# Patient Record
Sex: MALE | Race: OTHER | HISPANIC OR LATINO | Employment: FULL TIME | URBAN - METROPOLITAN AREA
[De-identification: names, ages, dates, MRNs, and addresses within clinical notes are randomized per-mention and may not be internally consistent; named-entity substitution may affect disease eponyms.]

---

## 2017-04-07 ENCOUNTER — ALLSCRIPTS OFFICE VISIT (OUTPATIENT)
Dept: OTHER | Facility: OTHER | Age: 47
End: 2017-04-07

## 2017-04-07 DIAGNOSIS — I10 ESSENTIAL (PRIMARY) HYPERTENSION: ICD-10-CM

## 2017-04-07 DIAGNOSIS — E78.5 HYPERLIPIDEMIA: ICD-10-CM

## 2017-04-07 DIAGNOSIS — S39.012A STRAIN OF MUSCLE, FASCIA AND TENDON OF LOWER BACK, INITIAL ENCOUNTER: ICD-10-CM

## 2017-04-07 DIAGNOSIS — E11.22 TYPE 2 DIABETES MELLITUS WITH DIABETIC CHRONIC KIDNEY DISEASE (HCC): ICD-10-CM

## 2017-04-09 ENCOUNTER — TRANSCRIBE ORDERS (OUTPATIENT)
Dept: LAB | Facility: HOSPITAL | Age: 47
End: 2017-04-09

## 2017-04-09 ENCOUNTER — APPOINTMENT (OUTPATIENT)
Dept: LAB | Facility: HOSPITAL | Age: 47
End: 2017-04-09
Payer: COMMERCIAL

## 2017-04-09 DIAGNOSIS — E11.22 TYPE 2 DIABETES MELLITUS WITH DIABETIC CHRONIC KIDNEY DISEASE (HCC): ICD-10-CM

## 2017-04-09 DIAGNOSIS — E78.5 HYPERLIPIDEMIA: ICD-10-CM

## 2017-04-09 DIAGNOSIS — I10 ESSENTIAL (PRIMARY) HYPERTENSION: ICD-10-CM

## 2017-04-09 LAB
ALBUMIN SERPL BCP-MCNC: 4 G/DL (ref 3.5–5)
ALP SERPL-CCNC: 80 U/L (ref 46–116)
ALT SERPL W P-5'-P-CCNC: 36 U/L (ref 12–78)
ANION GAP SERPL CALCULATED.3IONS-SCNC: 5 MMOL/L (ref 4–13)
AST SERPL W P-5'-P-CCNC: 10 U/L (ref 5–45)
BILIRUB SERPL-MCNC: 0.68 MG/DL (ref 0.2–1)
BUN SERPL-MCNC: 11 MG/DL (ref 5–25)
CALCIUM SERPL-MCNC: 8.8 MG/DL (ref 8.3–10.1)
CHLORIDE SERPL-SCNC: 103 MMOL/L (ref 100–108)
CHOLEST SERPL-MCNC: 92 MG/DL (ref 50–200)
CO2 SERPL-SCNC: 31 MMOL/L (ref 21–32)
CREAT SERPL-MCNC: 0.82 MG/DL (ref 0.6–1.3)
CREAT UR-MCNC: 77.3 MG/DL
EST. AVERAGE GLUCOSE BLD GHB EST-MCNC: 214 MG/DL
GFR SERPL CREATININE-BSD FRML MDRD: >60 ML/MIN/1.73SQ M
GLUCOSE P FAST SERPL-MCNC: 180 MG/DL (ref 65–99)
HBA1C MFR BLD: 9.1 % (ref 4.2–6.3)
HDLC SERPL-MCNC: 30 MG/DL (ref 40–60)
LDLC SERPL CALC-MCNC: 39 MG/DL (ref 0–100)
MICROALBUMIN UR-MCNC: 183 MG/L (ref 0–20)
MICROALBUMIN/CREAT 24H UR: 237 MG/G CREATININE (ref 0–30)
POTASSIUM SERPL-SCNC: 4.3 MMOL/L (ref 3.5–5.3)
PROT SERPL-MCNC: 7.4 G/DL (ref 6.4–8.2)
SODIUM SERPL-SCNC: 139 MMOL/L (ref 136–145)
TRIGL SERPL-MCNC: 114 MG/DL

## 2017-04-09 PROCEDURE — 36415 COLL VENOUS BLD VENIPUNCTURE: CPT

## 2017-04-09 PROCEDURE — 83036 HEMOGLOBIN GLYCOSYLATED A1C: CPT

## 2017-04-09 PROCEDURE — 82570 ASSAY OF URINE CREATININE: CPT

## 2017-04-09 PROCEDURE — 82043 UR ALBUMIN QUANTITATIVE: CPT

## 2017-04-09 PROCEDURE — 80061 LIPID PANEL: CPT

## 2017-04-09 PROCEDURE — 80053 COMPREHEN METABOLIC PANEL: CPT

## 2017-04-14 ENCOUNTER — ALLSCRIPTS OFFICE VISIT (OUTPATIENT)
Dept: OTHER | Facility: OTHER | Age: 47
End: 2017-04-14

## 2017-04-25 ENCOUNTER — OFFICE VISIT (OUTPATIENT)
Dept: URGENT CARE | Age: 47
End: 2017-04-25
Payer: COMMERCIAL

## 2017-04-25 PROCEDURE — S9083 URGENT CARE CENTER GLOBAL: HCPCS | Performed by: FAMILY MEDICINE

## 2017-04-25 PROCEDURE — G0382 LEV 3 HOSP TYPE B ED VISIT: HCPCS | Performed by: FAMILY MEDICINE

## 2017-05-02 ENCOUNTER — GENERIC CONVERSION - ENCOUNTER (OUTPATIENT)
Dept: OTHER | Facility: OTHER | Age: 47
End: 2017-05-02

## 2017-05-06 ENCOUNTER — OFFICE VISIT (OUTPATIENT)
Dept: URGENT CARE | Age: 47
End: 2017-05-06
Payer: COMMERCIAL

## 2017-05-06 PROCEDURE — S9083 URGENT CARE CENTER GLOBAL: HCPCS | Performed by: FAMILY MEDICINE

## 2017-05-06 PROCEDURE — G0382 LEV 3 HOSP TYPE B ED VISIT: HCPCS | Performed by: FAMILY MEDICINE

## 2017-05-16 ENCOUNTER — ALLSCRIPTS OFFICE VISIT (OUTPATIENT)
Dept: OTHER | Facility: OTHER | Age: 47
End: 2017-05-16

## 2017-10-23 ENCOUNTER — APPOINTMENT (OUTPATIENT)
Dept: RADIOLOGY | Age: 47
End: 2017-10-23
Attending: FAMILY MEDICINE
Payer: COMMERCIAL

## 2017-10-23 ENCOUNTER — TRANSCRIBE ORDERS (OUTPATIENT)
Dept: URGENT CARE | Age: 47
End: 2017-10-23

## 2017-10-23 ENCOUNTER — OFFICE VISIT (OUTPATIENT)
Dept: URGENT CARE | Age: 47
End: 2017-10-23
Payer: COMMERCIAL

## 2017-10-23 DIAGNOSIS — S99.929A INJURY OF FOOT: ICD-10-CM

## 2017-10-23 PROCEDURE — G0383 LEV 4 HOSP TYPE B ED VISIT: HCPCS | Performed by: FAMILY MEDICINE

## 2017-10-23 PROCEDURE — S9083 URGENT CARE CENTER GLOBAL: HCPCS | Performed by: FAMILY MEDICINE

## 2017-10-23 PROCEDURE — 73630 X-RAY EXAM OF FOOT: CPT

## 2017-10-23 PROCEDURE — 73610 X-RAY EXAM OF ANKLE: CPT
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
## 2018-01-12 VITALS
HEIGHT: 68 IN | HEART RATE: 76 BPM | OXYGEN SATURATION: 97 % | SYSTOLIC BLOOD PRESSURE: 160 MMHG | RESPIRATION RATE: 16 BRPM | DIASTOLIC BLOOD PRESSURE: 110 MMHG | WEIGHT: 241.13 LBS | BODY MASS INDEX: 36.54 KG/M2

## 2018-01-15 VITALS
HEIGHT: 68 IN | BODY MASS INDEX: 36.54 KG/M2 | WEIGHT: 241.13 LBS | TEMPERATURE: 99.5 F | HEART RATE: 78 BPM | DIASTOLIC BLOOD PRESSURE: 100 MMHG | RESPIRATION RATE: 16 BRPM | SYSTOLIC BLOOD PRESSURE: 160 MMHG

## 2018-01-15 NOTE — MISCELLANEOUS
Provider Comments  Provider Comments:   PT WAS A NO SHOW TODAY      Signatures   Electronically signed by : Zander Norwood, ; May 16 2017  5:57PM EST                       (Author)

## 2018-01-18 NOTE — MISCELLANEOUS
Message  Return to work or school:   Chandan South is under my professional care  He was seen in my office on 4/25/17        Mr Brandi David is not contagious        Future Appointments    Signatures   Electronically signed by : LUIS Askew ; Apr 25 2017 10:20AM EST                       (Author)

## 2018-01-18 NOTE — MISCELLANEOUS
Message  Return to work or school:   Chandan South is under my professional care  He was seen in my office on 10/23/2017   He is able to return to work on  10/24/17       Leatha Jerome PA-C        Signatures   Electronically signed by : Leatha Jerome, [facility]; Oct 23 2017  3:00PM EST                       (Author)    Electronically signed by : Leatha Jerome, [facility]; Oct 23 2017  3:04PM EST
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
## 2018-01-18 NOTE — MISCELLANEOUS
Chief Complaint  Chief Complaint: Chief Complaint:   The patient presents to the office today with ER 2nd to high BP & glucose readings at home  Message  Message Free Text Note Form: ER:   - HTN- 206 came down to 717 systolic without medication  Pt said home range is 130-190 on metoprolol bid & lisinopril 5   - DM2- checks sugar after eating and it is high  300s after breakfast  Complains of daily blurry vision  I personally saw the patient in the ER before discharge to go over his plan of care as described below  Discussion/Summary  Discussion Summary:   1) HTN  --> increased lisinopril 5 to 10 qd     2) DM2  --> Increased metformin 500 bid to 1000mg bid (pt denied GI issues on bid x3 months)  --> Educated to check sugar ac and hs  3) Blurry vision  --> Ophthal referral and info given personally to patient    4) candida balanitis  --> clotrimazole sent to pharmacy x3 weeks bid, will reassess thursday in office  Counseled on hygiene and avoiding soap and petroleum or irritants to the area  Last A1C 8, +microalbuminuria (10/2016)    Refilled all meds at [facility]  He's on lipitor  Will recheck BP on thursday in office and repeat A1C        Signatures   Electronically signed by : Linden Julian DO; Dec 27 2016 10:20AM EST                       (Author)

## 2018-01-29 ENCOUNTER — OFFICE VISIT (OUTPATIENT)
Dept: FAMILY MEDICINE CLINIC | Facility: CLINIC | Age: 48
End: 2018-01-29
Payer: COMMERCIAL

## 2018-01-29 VITALS
HEART RATE: 72 BPM | DIASTOLIC BLOOD PRESSURE: 96 MMHG | RESPIRATION RATE: 16 BRPM | SYSTOLIC BLOOD PRESSURE: 160 MMHG | WEIGHT: 257 LBS | TEMPERATURE: 97.1 F | HEIGHT: 69 IN | BODY MASS INDEX: 38.06 KG/M2

## 2018-01-29 DIAGNOSIS — E11.9 TYPE 2 DIABETES MELLITUS WITHOUT COMPLICATION, WITHOUT LONG-TERM CURRENT USE OF INSULIN (HCC): ICD-10-CM

## 2018-01-29 DIAGNOSIS — I10 HYPERTENSION, UNSPECIFIED TYPE: Primary | ICD-10-CM

## 2018-01-29 PROCEDURE — 99213 OFFICE O/P EST LOW 20 MIN: CPT | Performed by: FAMILY MEDICINE

## 2018-01-29 RX ORDER — GLIMEPIRIDE 2 MG/1
TABLET ORAL
COMMUNITY
Start: 2017-04-13 | End: 2018-03-20 | Stop reason: SDDI

## 2018-01-29 RX ORDER — LANCETS
EACH MISCELLANEOUS 2 TIMES DAILY
Qty: 60 EACH | Refills: 5 | Status: SHIPPED | OUTPATIENT
Start: 2018-01-29 | End: 2020-07-07 | Stop reason: SDUPTHER

## 2018-01-29 RX ORDER — NAPROXEN 500 MG/1
500 TABLET ORAL EVERY 12 HOURS
Refills: 0 | COMMUNITY
Start: 2017-10-26 | End: 2019-07-11 | Stop reason: ALTCHOICE

## 2018-01-29 RX ORDER — LANCETS
EACH MISCELLANEOUS 2 TIMES DAILY
COMMUNITY
Start: 2016-10-06 | End: 2018-01-29 | Stop reason: SDUPTHER

## 2018-01-29 RX ORDER — METOPROLOL TARTRATE 100 MG/1
100 TABLET ORAL 2 TIMES DAILY
Qty: 60 TABLET | Refills: 5 | Status: SHIPPED | OUTPATIENT
Start: 2018-01-29 | End: 2018-07-24 | Stop reason: SDUPTHER

## 2018-01-29 RX ORDER — LISINOPRIL 40 MG/1
40 TABLET ORAL DAILY
Qty: 30 TABLET | Refills: 0 | Status: SHIPPED | OUTPATIENT
Start: 2018-01-29 | End: 2018-03-16 | Stop reason: SDUPTHER

## 2018-01-29 RX ORDER — METOPROLOL TARTRATE 100 MG/1
1 TABLET ORAL 2 TIMES DAILY
COMMUNITY
Start: 2014-10-08 | End: 2018-01-29 | Stop reason: SDUPTHER

## 2018-01-29 RX ORDER — LISINOPRIL 20 MG/1
1 TABLET ORAL DAILY
COMMUNITY
Start: 2016-12-27 | End: 2018-01-29 | Stop reason: DRUGHIGH

## 2018-01-29 NOTE — PROGRESS NOTES
Debbi Peabody 1970 male MRN: [id number]    Family Medicine Acute Visit    ASSESSMENT/PLAN  Problem List Items Addressed This Visit     Hypertension - Primary     Blood Pressure: 160/96   - Given elevation >200/100 while admitted to hospital, and continued elevation above goal, will increase Lisinopril from 20 mg to 40 mg daily and continue Metoprolol 100 mg BID   - Discussed precautions including headache, vision changes, dizziness, chest pain, palpitations -- encouraged pt to return to ED if develops any of the above          Relevant Medications    metoprolol tartrate (LOPRESSOR) 100 mg tablet    lisinopril (ZESTRIL) 40 mg tablet    Type 2 diabetes mellitus without complication, without long-term current use of insulin ([facility])     A1c 7 1% at [facility] during admission (previously 9 1% in 4/2017)   - Continue current regimen of Metformin 500 mg TID w/ meals and Glimepiride 2 mg daily   - Pt states that his blood sugar was elevated (>300) during hospital admission, therefore encouraged him to check AM sugars prior to breakfast x1 week to monitor -- if within good range, pt will not need to check blood sugars regularly         Relevant Medications    glimepiride (AMARYL) 2 mg tablet    metFORMIN (GLUCOPHAGE) 500 mg tablet    ACCU-CHEK FASTCLIX LANCETS MISC    glucose blood (ACCU-CHEK SMARTVIEW) test strip              Future Appointments  Date Time Provider Beena Delgado   2/5/2018 2:20 PM DO TR Zheng [facility]          SUBJECTIVE  CC: Hypertension and Follow-up      HPI:  Debbi Peabody is a 41 y o  male who presents for hospital follow up  Pt was admitted to [facility] from 1/25-1/26 due to elevated BP  Records reviewed: pt had negative CT Head as well as an ECHO which demonstrated mild concentric LVH, trace MR, mild TR, mild pulmonary HTN and EF 65%       HTN   - Has been taking Lopressor, Metoprolol   - Pt notes increased stress lately  - Pt's wife states he eats a great deal of salt   - Pt states that he had headache, dizziness, and vision changes, which is what prompted him to go to the ED     DM2  - Has been taking Metformin, Glimepiride  - A1c during admission 7 1%   - Pt requesting refill of medications, test strips, and lancets        Review of Systems   Eyes: Positive for visual disturbance  Cardiovascular: Negative for chest pain, palpitations and leg swelling  Neurological: Positive for dizziness and headaches  Historical Information   The patient history was reviewed as follows:  Past Medical History:   Diagnosis Date    Diabetes mellitus ([facility] [address] )     Hypertension          History reviewed  No pertinent surgical history    Family History   Problem Relation Age of Onset    Hypertension Mother     Heart disease Mother     Sleep apnea Mother     Diabetes Father     Heart disease Father     Hypertension Father       Social History   History   Alcohol Use No     History   Drug Use No     History   Smoking Status    Never Smoker   Smokeless Tobacco    Current User       Medications:     Current Outpatient Prescriptions:     ACCU-CHEK FASTCLIX LANCETS MISC, by Does not apply route 2 (two) times a day, Disp: 60 each, Rfl: 5    cyanocobalamin ([employer]S VITAMIN B-12) 1000 MCG tablet, Take 1,000 mcg by mouth, Disp: , Rfl:     glimepiride (AMARYL) 2 mg tablet, Take by mouth, Disp: , Rfl:     glucose blood (ACCU-CHEK SMARTVIEW) test strip, 1 each by Other route 2 (two) times a day, Disp: 100 each, Rfl: 5    metFORMIN (GLUCOPHAGE) 500 mg tablet, Take 1 tablet (500 mg total) by mouth 3 (three) times a day with meals, Disp: 90 tablet, Rfl: 0    metoprolol tartrate (LOPRESSOR) 100 mg tablet, Take 1 tablet (100 mg total) by mouth 2 (two) times a day, Disp: 60 tablet, Rfl: 5    naproxen (NAPROSYN) 500 mg tablet, Take 500 mg by mouth every 12 (twelve) hours, Disp: , Rfl: 0    lisinopril (ZESTRIL) 40 mg tablet, Take 1 tablet (40 mg total) by mouth daily, Disp: 30 tablet, Rfl: 0    No Known Allergies    OBJECTIVE  Vitals:   Vitals:    01/29/18 1026   BP: 160/96   BP Location: Right arm   Patient Position: Sitting   Cuff Size: Large   Pulse: 72   Resp: 16   Temp: (!) 97 1 °F (36 2 °C)   TempSrc: Tympanic   Weight: 117 kg (257 lb)   Height: 5' 9 2" (1 758 m)         Physical Exam   Constitutional: He is oriented to person, place, and time  He appears well-developed and well-nourished  Cardiovascular: Normal rate and regular rhythm  Pulmonary/Chest: Effort normal and breath sounds normal    Abdominal: Soft  Bowel sounds are normal  He exhibits no distension  There is no tenderness  Neurological: He is alert and oriented to person, place, and time  He has normal strength  No cranial nerve deficit  Coordination normal    Skin: Skin is warm and dry  Psychiatric: He has a normal mood and affect                    Chandrakant Hoyt DO, PGY-2  [facility]   1/29/2018

## 2018-01-29 NOTE — ASSESSMENT & PLAN NOTE
A1c 7 1% at [facility] during admission (previously 9 1% in 4/2017)   - Continue current regimen of Metformin 500 mg TID w/ meals and Glimepiride 2 mg daily   - Pt states that his blood sugar was elevated (>300) during hospital admission, therefore encouraged him to check AM sugars prior to breakfast x1 week to monitor -- if within good range, pt will not need to check blood sugars regularly

## 2018-01-29 NOTE — PATIENT INSTRUCTIONS
10% - bad control"> 10% - bad control,Hemoglobin A1c (HbA1c) greater than 10% indicating poor diabetic control,Haemoglobin A1c greater than 10% indicating poor diabetic control">   Diabetes mellitus tipo 2 en adultos, cuidados ambulatorios   INFORMACIÓN GENERAL:   La diabetes mellitus tipo 2 en adultos  es homa enfermedad que afecta la forma en que el cuerpo utiliza la glucosa (azúcar)  La insulina ayuda a extraer el azúcar de la hay para que pueda usarse en la producción de energía  Generalmente, cuando el nivel de azúcar Jonah, el páncreas produce más insulina  La diabetes tipo 2 se desarrolla ya sea porque el cuerpo no puede producir suficiente insulina, o no la puede usar correctamente  Después de [employer], wolfe páncreas podría dejar de producir insulina  Síntomas comunes incluyen los siguientes:   · Más hambre o sed de la usual    · Necesidad frecuente de orinar     · Pérdida de peso sin tratar     · Visión borrosa  Busque cuidados inmediatos para los siguientes síntomas:   · Dolor abdominal severo o dolor que se propaga hacia wolfe espalda  Es probable que usted también vomite  · Dificultad para permanecer despierto o concentrado    · Temblores o sudoración    · Visión borrosa o doble    · Aliento con olor a frutas o dayron    · Respiración profunda y dificultosa o rápida y superficial    · Ritmo cardíaco rápido y débil  El tratamiento para la diabetes mellitus tipo 2  incluye mantener wolfe nivel de azúcar en el hay a un rango normal  Usted debe comer los alimentos correctos y ejercitarse con regularidad  Además es probable que necesite medicamentos si no puede controlar wolfe nivel de azúcar en la hay con nutrición y ejercicio  Maneje la diabetes mellitus tipo 2:   · Revise wolfe nivel de azúcar en la hay  A usted le enseñarán cómo revisar homa pequeña gota de [place]uma ness en un medidor de glucosa  Pregúntele a wolfe proveedor de amado cuándo y con cuánta frecuencia es necesario revisar lali el día   Marysol Moan pregúntele a wolfe proveedor de amado cuáles deberían ser denilson niveles de azúcar en la hay cuando usted se los revisa  · Mantenga un registro de los carbohidratos (azúcares y almidones)  Wolfe nivel de azúcar en la hay puede elevarse demasiado si usted come demasiados carbohidratos  Wolfe dietista le ayudará a planear comidas y meriendas que tengan la cantidad correcta de carbohidratos  · Consuma alimentos bajos en grasas  Danton Gale son el kenneth sin piel y la leche descremada  · Consuma menos sodio (sal)  Algunos ejemplos de alimentos altos en sodio que hay que limitar son la salsa de soya, las law tostadas y la sopa  No le agregue sal a la comida que usted cocina  Limite wolfe uso de sal de martinez  · Coma alimentos altos en fibra  Alimentos que son buena aviva de fibra incluyen los vegetales, el pan integral y los frijoles  · Limite el alcohol  El alcohol afecta wolfe nivel de azúcar en la hay y puede dificultar el [place] de wolfe diabetes  Las mujeres deben limitar el consumo de alcohol a 1 bebida al día  Los hombres deben limitarlo a 2 debidas al día  Homa bebida de alcohol equivale a 12 onzas de cerveza, 5 onzas de vino o 1½ onzas de licor  · Ejercítese regularmente  El ejercicio puede ayudar a mantener estable wolfe nivel de azúcar en la hay, al mismo tiempo que disminuye wolfe riesgo de enfermedad cardíaca y le ayuda a perder peso  Ejercítese por al menos 30 minutos, 5 días a la semana  Adron Cambric de fortalecimiento muscular 2 días a la semana  Colabore con wolfe proveedor de amado para crear un plan de ejercicios  · Revise denilson pies a diario  para hortensia si tienen heridas o llagas abiertas  Pregúntele a wolfe proveedor de [employer] que usted puede hacer si tiene homa llaga abierta  · Deje de fumar  Si usted fuma, nunca es tarde para dejar de hacerlo  El fumar puede [employer] problemas que puedan ocurrir con la diabetes   Pregúntele a wolfe proveedor de [employer] información para aprender a dejar de fumar si usted tiene dificultad para hacerlo  · Pregunte sobre cain peso:  Pregúntele a denilson proveedores de amado si usted necesita perder peso y cuánto debe perder  Pídales que le ayuden con un programa de perdida de [place]  Incluso perder unas 10 a 15 libras puede ayudarle a manejar cain nivel de azúcar en la hay  · Tenga a mano cain identificación de [employer]  Use un brazalete de alerta médica o lleve consigo homa tarjeta que diga que usted tiene diabetes  Pregúntele a cain proveedor de amado dónde conseguir estos artículos  · Pregunte sobre vacunas  La diabetes lo pone a usted en riesgo de enfermedad seria si usted se resfría, tiene neumonía o hepatitis  Pregúntele a cain proveedor de amado si usted debe ponerse las vacunas contra la gripe, neumonía o hepatitis B, y cuándo ponérselas  Programe homa pj con cain proveedor de [employer] se le haya indicado: Anote denilson preguntas para que se acuerde de hacerlas lali denilson visitas  ACUERDOS SOBRE CAIN CUIDADO:   Usted tiene el derecho de participar en la planificación de cain cuidado  Aprenda todo lo que pueda sobre cain condición y diane darle tratamiento  Discuta con denilson médicos denilson opciones de tratamiento para juntos decidir el cuidado que usted quiere recibir  Usted siempre tiene el derecho a rechazar cain tratamiento  Esta información es sólo para uso en educación  Cain intención no es darle un consejo médico sobre enfermedades o tratamientos  Colsulte con cain Hilario Jewels farmacéutico antes de seguir cualquier régimen médico para saber si es seguro y efectivo para usted  © 2014 [address] is for End User's use only and may not be sold, redistributed or otherwise used for commercial purposes  All illustrations and images included in CareNotes® are the copyrighted property of A D A M , Inc  or Davon Sidhu    Hipertensión crónica   LO QUE NECESITA SABHARRISON:   La hipertensión es la presión arterial terrence  La presión arterial es la fuerza que ejerce la hay contra las delacruz de las arterias  La presión arterial normal debería estar a menos de 120/80  La pre-hipertensión estaría entre 120/80 y 139/ 80  La presión arterial terrence estaría a 140/90 o más terrence  La hipertensión causa que wolfe presión arterial se eleve tanto que wolfe corazón se ve forzado a trabajar [employer] de lo normal  [place] puede dañar wolfe corazón  La hipertensión crónica es homa condición de jordon plazo que usted puede controlar con un estilo de moe j carlos o con medicamentos  La presión [place] a proteger denilson órganos diane wolfe corazón, pulmones, cerebro, y riñones  INSTRUCCIONES SOBRE EL [facility]ITALARIA:   Llame al 911 en modesta de presentar lo siguiente:   · Usted tiene malestar en el pecho que se siente diane estrujamiento, presión, Maldonado Fees o dolor  · Usted se siente confundido o tiene dificultad para hablar  · Repentinamente se siente aturdido o con dificultad para respirar  · Usted tiene dolor o [employer] espalda, [place], [place], abdomen o [place]  [place] a la veena de emergencias si:   · Usted tiene un nic dolor de belinda o pérdida de la visión  · Usted tiene debilidad en un brazo o en homa pierna  Pregúntele a wolfe Mary Broom vitaminas y minerales son adecuados para usted  · Usted se siente mareado, confundido, somnoliento o diane si se fuera a desmayar  · Usted se ha tomado wolfe medicamento para la presión arterial giovanni wolfe presión arterial todavía está más terrence de lo que le indicó wolfe médico     · Usted tiene preguntas o inquietudes acerca de wolfe condición o cuidado  Medicamentos:  Es posible que usted necesite alguno de los siguientes:  · Medicamento  podría usarse para ayudar a disminuir la presión arterial  Es posible que necesite más de un tipo de [place]  [place] el medicamento exactamente diane indicado       · Diuréticos  ayudan a eliminar el exceso de líquido que se acumula en el organismo  [place] contribuirá a bajar wolfe presión arterial  Es posible que orine más seguido mientras lauren salvatore medicamento  · Los medicamentos para el colesterol  ayudan a bajar los niveles de [place]  Un nivel bajo de colesterol ayuda a prevenir enfermedades cardíacas y facilita el control de la presión arterial      · Cubero denilson medicamentos diane se le haya indicado  Consulte con wolfe médico si usted pillo que wolfe medicamento no le está ayudando o si presenta efectos secundarios  Infórmele si es alérgico a cualquier medicamento  Mantenga homa lista actualizada de los [employer], las vitaminas y los productos herbales que lauren  Incluya los siguientes datos de los medicamentos: cantidad, frecuencia y motivo de administración  Traiga con usted la lista o los envases de la píldoras a denilson citas de seguimiento  Lleve la lista de los medicamentos con usted en modesta de homa emergencia  Acuda a denilson consultas de control con wolfe médico según le indicaron  Usted necesitará regresar para medir wolfe presión arterial y realizar otros exámenes de laboratorio  Anote denilson preguntas para que se acuerde de hacerlas lali denilson visitas  Controle la hipertensión crónica:  Hable con wolfe médico sobre las siguientes recomendaciones y otras formas de controlar la hipertensión:  · Tómese la presión arterial en wolfe casa  Siéntese y descanse por 5 minutos antes de tomarse la presión arterial  Extienda wolfe brazo y apóyelo en homa superficie plana  Wolfe brazo debe estar a la misma altura que wolfe corazón  Siga las instrucciones que vienen con el monitor para la presión arterial o tensiómetro  Si es posible tome por lo menos 2 lecturas de la presión cada vez  Tómese la presión arterial por lo [employer]previously [employer]) al día a la misma hora todos los días, homa en la mañana y la otra en la noche  Mantenga un registro de las lecturas de wolfe presión arterial y llévelo consigo a denilson consultas   Pregúntele a wolfe médico cuál debería ser wolfe presión arterial  · Limite el sodio (la sal) diane se le haya indicado  Demasiado sodio puede afectar el equilibrio de líquidos  Revise las etiquetas para buscar alimentos bajos en sodio o sin sal agregada  Algunos alimentos bajos en sodio utilizan sales de potasio para añadir sabor  Demasiado potasio también puede causar problemas de [place]  Wolfe médico le dirá qué cantidad de sodio y potasio es skel[place] para el consumo en un día  Él puede recomendarle que limite el sodio a 2,300 mg al día  · Siga el plan de comidas recomendado por wolfe médico   Un dietista o médico puede darle más información sobre planes de bajo contenido de sodio o el plan de alimentación DASH (enfoques dietéticos para detener la hipertensión)  El plan DASH es bajo en sodio, grasas saturadas y grasa total  Es alto en potasio, calcio y [place]  · Ejercítese para mantener un peso saludable  Realice actividad física por lo menos 30 minutos al día, la mayoría de los días de la [place]  Maybee ayudará a bajar wolfe presión arterial  Pida más información acerca de un plan de ejercicio adecuado para usted  · [address] [place] [address]  Maybee podría ayudarlo a bajar wolfe presión arterial  Aprenda sobre formas de relajarse, diane respiración profunda o escuchar música  · Limite el consumo de alcohol  Las mujeres deberían limitar el consumo de alcohol a 1 bebida por día  Los hombres deberían limitar el consumo de alcohol a 2 tragos al día  Un trago equivale a 12 onzas de cerveza, 5 onzas de vino o 1 onza y ½ de licor  · No fume  La nicotina y otros químicos en los cigarrillos y cigarros pueden aumentar wolfe presión arterial y también pueden provocar daño al pulmón  Pida información a wolfe médico si usted actualmente fuma y necesita ayuda para dejar de fumar  Los cigarrillos electrónicos o tabaco sin humo todavía contienen nicotina  Consulte con wolfe médico antes de [employer]    © 2017 [address] Information is for End User's use only and may not be sold, redistributed or otherwise used for commercial purposes  All illustrations and images included in CareNotes® are the copyrighted property of A LUIS [employer] Inc  or [address]  Esta información es sólo para uso en educación  Wolfe intención no es darle un consejo médico sobre enfermedades o tratamientos  Colsulte con wolfe Héctor Patch farmacéutico antes de seguir cualquier régimen médico para saber si es seguro y efectivo para usted

## 2018-01-29 NOTE — ASSESSMENT & PLAN NOTE
Blood Pressure: 160/96   - Given elevation >200/100 while admitted to hospital, and continued elevation above goal, will increase Lisinopril from 20 mg to 40 mg daily and continue Metoprolol 100 mg BID   - Discussed precautions including headache, vision changes, dizziness, chest pain, palpitations -- encouraged pt to return to ED if develops any of the above

## 2018-02-04 RX ORDER — AMLODIPINE BESYLATE 10 MG/1
10 TABLET ORAL DAILY
Refills: 0 | COMMUNITY
Start: 2018-01-29 | End: 2021-04-27

## 2018-02-10 RX ORDER — LISINOPRIL 10 MG/1
TABLET ORAL
Qty: 30 TABLET | Refills: 11 | OUTPATIENT
Start: 2018-02-10

## 2018-02-22 ENCOUNTER — OFFICE VISIT (OUTPATIENT)
Dept: FAMILY MEDICINE CLINIC | Facility: CLINIC | Age: 48
End: 2018-02-22
Payer: COMMERCIAL

## 2018-02-22 VITALS
WEIGHT: 253.4 LBS | DIASTOLIC BLOOD PRESSURE: 90 MMHG | RESPIRATION RATE: 16 BRPM | SYSTOLIC BLOOD PRESSURE: 130 MMHG | TEMPERATURE: 99 F | HEIGHT: 69 IN | BODY MASS INDEX: 37.53 KG/M2 | HEART RATE: 78 BPM

## 2018-02-22 DIAGNOSIS — E11.9 TYPE 2 DIABETES MELLITUS WITHOUT COMPLICATION, WITHOUT LONG-TERM CURRENT USE OF INSULIN (HCC): Primary | ICD-10-CM

## 2018-02-22 DIAGNOSIS — E08.65 DIABETES MELLITUS DUE TO UNDERLYING CONDITION WITH HYPERGLYCEMIA, WITHOUT LONG-TERM CURRENT USE OF INSULIN (HCC): ICD-10-CM

## 2018-02-22 LAB
CREAT UR-MCNC: 23 MG/DL
MICROALBUMIN UR-MCNC: 40.7 MG/L (ref 0–20)
MICROALBUMIN/CREAT 24H UR: 177 MG/G CREATININE (ref 0–30)
SL AMB  POCT GLUCOSE, UA: 2000
SL AMB LEUKOCYTE ESTERASE,UA: NEGATIVE
SL AMB POCT BILIRUBIN,UA: NEGATIVE
SL AMB POCT BLOOD,UA: NEGATIVE
SL AMB POCT CLARITY,UA: CLEAR
SL AMB POCT COLOR,UA: YELLOW
SL AMB POCT GLUCOSE BLD: 411
SL AMB POCT KETONES,UA: NEGATIVE
SL AMB POCT NITRITE,UA: NEGATIVE
SL AMB POCT PH,UA: NEGATIVE
SL AMB POCT SPECIFIC GRAVITY,UA: 1
SL AMB POCT URINE PROTEIN: NEGATIVE
SL AMB POCT UROBILINOGEN: 0.2

## 2018-02-22 PROCEDURE — 3060F POS MICROALBUMINURIA REV: CPT | Performed by: FAMILY MEDICINE

## 2018-02-22 PROCEDURE — 81002 URINALYSIS NONAUTO W/O SCOPE: CPT | Performed by: FAMILY MEDICINE

## 2018-02-22 PROCEDURE — 81003 URINALYSIS AUTO W/O SCOPE: CPT | Performed by: FAMILY MEDICINE

## 2018-02-22 PROCEDURE — 82962 GLUCOSE BLOOD TEST: CPT | Performed by: FAMILY MEDICINE

## 2018-02-22 PROCEDURE — 82570 ASSAY OF URINE CREATININE: CPT | Performed by: FAMILY MEDICINE

## 2018-02-22 PROCEDURE — 99213 OFFICE O/P EST LOW 20 MIN: CPT | Performed by: FAMILY MEDICINE

## 2018-02-22 PROCEDURE — 82043 UR ALBUMIN QUANTITATIVE: CPT | Performed by: FAMILY MEDICINE

## 2018-02-22 NOTE — ASSESSMENT & PLAN NOTE
- Controlled A1c 7 1% at [facility] during HTN admission 1/2018 (previously 9 1% in 4/2017)   - Accucheck in off 411, urine dipstick +++glucose but no ketones  - >1 year ago his microalb/Cr ratio was 133, send out urine today to repeat  - Increase current regimen of Metformin 500 mg TID to 1g BID if tolerated, and then in 2-3 weeks increase Glimepiride 2 mg daily to 4mg (if 1g metformin isn't tolerated, try increasing sulfa; if that fails, consider alternative medication)  > Diabetes education clinic & endocrinology referral re-ordered given consistent patient anxiety about his diabetes despite office reassurance, dietary recommendations, and counseling  Patient barriers: M-F work difficulty taking PTO   >F/u 1 month and repeat A1C

## 2018-02-22 NOTE — PROGRESS NOTES
Nidhi Marshall 1970 male MRN: [id number]    Follow-up Visit      SUBJECTIVE  CC: Blood Sugar Problem (sugars are to high, went to the hospital on 2/2/2018 Pt states his blood pressures are still high and hes taking the medication ) and Blurred Vision (Pt states he had a episode of blindness for 20 sec yesterday )      HPI:  Nidhi Marshall is a 57 y o  male w/HTN and NIDDM2, poorly compliant to dietary changes, who presented for a follow-up of Elevated home blood glucose  He is not taking insulin, but he has been checking his sugars and becomes concerned with elevated levels  Today he said his blood sugar was 390s  Pending ophthalmology visit next week d/t blurry vision  Says he has been eating more salad with chicken  Review of Systems   Constitutional: Negative for chills, diaphoresis, fatigue, fever and unexpected weight change  Eyes: Positive for visual disturbance  Negative for pain and discharge  Respiratory: Negative for cough and shortness of breath  Cardiovascular: Negative for chest pain, palpitations and leg swelling  Gastrointestinal: Positive for diarrhea  Negative for abdominal pain, constipation and vomiting  Has diarrhea if he takes 1g metformin, which has decreased over the past 2 days  Better with food  Endocrine: Positive for polydipsia and polyuria  Genitourinary: Negative for decreased urine volume, difficulty urinating, dysuria and urgency  Musculoskeletal: Negative for gait problem  Neurological: Negative for dizziness, tremors, syncope, weakness, light-headedness and headaches         Historical Information   The patient history was reviewed as follows:  Past Medical History:   Diagnosis Date    Depression     last assessed - 22Oct2012    Diabetes mellitus ([facility] [address] )     Hypertension     Syncope     last assessed - 22Jul2013    Vertigo     last assessed - 05Aug2013     Past Surgical History:   Procedure Laterality Date    NEUROPLASTY / TRANSPOSITION MEDIAN NERVE AT CARPAL TUNNEL      TONSILLECTOMY       Family History   Problem Relation Age of Onset    Hypertension Mother      Benign Essential HTN    Heart disease Mother     Sleep apnea Mother     Diabetes Father     Heart disease Father     Hypertension Father      Benign Essential HTN      Social History   History   Alcohol Use No     Comment: social drinker     History   Drug Use No     History   Smoking Status    Never Smoker   Smokeless Tobacco    Current User     Comment: Former smoker       Medications:   Meds/Allergies   Current Outpatient Prescriptions   Medication Sig Dispense Refill    ACCU-CHEK FASTCLIX LANCETS [address] [facility] by Does not apply route 2 (two) times a day 60 each 5    amLODIPine (NORVASC) 10 mg tablet Take 10 mg by mouth daily  0    cyanocobalamin ([employer] VITAMIN B-12) 1000 MCG tablet Take 1,000 mcg by mouth      glimepiride (AMARYL) 2 mg tablet Take by mouth      glucose blood (ACCU-CHEK SMARTVIEW) test strip 1 each by Other route 2 (two) times a day 100 each 5    lisinopril (ZESTRIL) 40 mg tablet Take 1 tablet (40 mg total) by mouth daily 30 tablet 0    metFORMIN (GLUCOPHAGE) 500 mg tablet Take 1 tablet (500 mg total) by mouth 3 (three) times a day with meals 90 tablet 0    metoprolol tartrate (LOPRESSOR) 100 mg tablet Take 1 tablet (100 mg total) by mouth 2 (two) times a day 60 tablet 5    naproxen (NAPROSYN) 500 mg tablet Take 500 mg by mouth every 12 (twelve) hours  0     No current facility-administered medications for this visit  No Known Allergies    OBJECTIVE  Vitals:   Vitals:    02/22/18 1656   BP: 130/90   Pulse: 78   Resp: 16   Temp: 99 °F (37 2 °C)   Weight: 115 kg (253 lb 6 4 oz)   Height: 5' 9 3" (1 76 m)       Invasive Devices          No matching active lines, drains, or airways          Physical Exam   Constitutional: He is oriented to person, place, and time  He appears well-developed and well-nourished  No distress  Obese   Neck: Neck supple  Cardiovascular: Normal rate, regular rhythm and normal heart sounds  No murmur heard  Pulmonary/Chest: Effort normal and breath sounds normal  No respiratory distress  He has no wheezes  He has no rales  Abdominal: Soft  Bowel sounds are normal  He exhibits no distension and no mass  There is no tenderness  There is no rebound and no guarding  Neurological: He is alert and oriented to person, place, and time  Coordination normal    Skin: No rash noted  He is not diaphoretic  Psychiatric: He has a normal mood and affect  His behavior is normal    Vitals reviewed  Lab:  I have personally reviewed all prior pertinent results  Office Visit on 02/22/2018   Component Date Value Ref Range Status     COLOR,UA 02/22/2018 yellow   Final     CLARITY,UA 02/22/2018 clear   Final     SPECIFIC GRAVITY,UA 02/22/2018 1 005   Final     PH,UA 02/22/2018 negative   Final    LEUKOCYTE ESTERASE,UA 02/22/2018 negative   Final     NITRITE,UA 02/22/2018 negative   Final    GLUCOSE, UA 02/22/2018 2000   Final     KETONES,UA 02/22/2018 negative   Final     BILIRUBIN,UA 02/22/2018 negative   Final     BLOOD,UA 02/22/2018 negative   Final    SL AMB POCT URINE PROTEIN 02/22/2018 negative   Final    SL AMB POCT UROBILINOGEN 02/22/2018 0 2   Final     GLUCOSE BLD 02/22/2018 411   Final   ]    Imaging:  I have personally reviewed all prior pertinent results  Assessment/Plan      Problem List Items Addressed This Visit     Type 2 diabetes mellitus without complication, without long-term current use of insulin ([facility] [address] ) - Primary     - Controlled A1c 7 1% at [facility] during HTN admission 1/2018 (previously 9 1% in 4/2017)   - Accucheck in off 411, urine dipstick +++glucose but no ketones  - >1 year ago his microalb/Cr ratio was 133, send out urine today to repeat    - Increase current regimen of Metformin 500 mg TID to 1g BID if tolerated, and then in 2-3 weeks increase Glimepiride 2 mg daily to 4mg (if 1g metformin isn't tolerated, try increasing sulfa; if that fails, consider alternative medication)  > Diabetes education clinic & endocrinology referral re-ordered given consistent patient anxiety about his diabetes despite office reassurance, dietary recommendations, and counseling  Patient barriers: M-F work difficulty taking PTO   >F/u 1 month and repeat A1C  Relevant Orders    POCT urine dip auto non-scope (Completed)    POCT blood glucose (Completed)      Other Visit Diagnoses     Diabetes mellitus due to underlying condition with hyperglycemia, without long-term current use of insulin (HCC)               Body mass index is 37 1 kg/m²   -> Counseled on diet, weight loss, aerobic and weight-bearing exercise  PMH, stable, chronic  -> Continuing all home medications or alternatives as reviewed and reconciled  Disposition: Anticipate stable interim  PCP: [facility]  Follow-up recommendations: Reassess in 1 month  Future Appointments  Date Time Provider Beena Knoxi   4/12/2018 5:40 PM Lyric Garsia DO [facility]          _____________________________________________________________________   The attending physician, Dr Yolanda Hernandez, agreed with the plan      Lyric Garsia DO, PGY-3  [facility]   2/22/2018

## 2018-03-16 DIAGNOSIS — E11.9 TYPE 2 DIABETES MELLITUS WITHOUT COMPLICATION, WITHOUT LONG-TERM CURRENT USE OF INSULIN (HCC): ICD-10-CM

## 2018-03-16 DIAGNOSIS — I10 HYPERTENSION, UNSPECIFIED TYPE: ICD-10-CM

## 2018-03-16 RX ORDER — LISINOPRIL 40 MG/1
40 TABLET ORAL DAILY
Qty: 30 TABLET | Refills: 0 | Status: SHIPPED | OUTPATIENT
Start: 2018-03-16 | End: 2018-03-20 | Stop reason: SDUPTHER

## 2018-03-20 ENCOUNTER — OFFICE VISIT (OUTPATIENT)
Dept: FAMILY MEDICINE CLINIC | Facility: CLINIC | Age: 48
End: 2018-03-20
Payer: COMMERCIAL

## 2018-03-20 ENCOUNTER — TELEPHONE (OUTPATIENT)
Dept: FAMILY MEDICINE CLINIC | Facility: CLINIC | Age: 48
End: 2018-03-20

## 2018-03-20 VITALS
RESPIRATION RATE: 16 BRPM | WEIGHT: 241.2 LBS | SYSTOLIC BLOOD PRESSURE: 130 MMHG | TEMPERATURE: 98 F | HEART RATE: 78 BPM | HEIGHT: 69 IN | BODY MASS INDEX: 35.73 KG/M2 | DIASTOLIC BLOOD PRESSURE: 82 MMHG

## 2018-03-20 DIAGNOSIS — E11.9 TYPE 2 DIABETES MELLITUS WITHOUT COMPLICATION, WITHOUT LONG-TERM CURRENT USE OF INSULIN (HCC): ICD-10-CM

## 2018-03-20 DIAGNOSIS — I10 HYPERTENSION, UNSPECIFIED TYPE: ICD-10-CM

## 2018-03-20 DIAGNOSIS — E11.65 TYPE 2 DIABETES MELLITUS WITH HYPERGLYCEMIA, WITHOUT LONG-TERM CURRENT USE OF INSULIN (HCC): Primary | ICD-10-CM

## 2018-03-20 LAB
SL AMB  POCT GLUCOSE, UA: 2000
SL AMB LEUKOCYTE ESTERASE,UA: NEGATIVE
SL AMB POCT BILIRUBIN,UA: NEGATIVE
SL AMB POCT BLOOD,UA: ABNORMAL
SL AMB POCT CLARITY,UA: CLEAR
SL AMB POCT COLOR,UA: YELLOW
SL AMB POCT GLUCOSE BLD: 363
SL AMB POCT KETONES,UA: NEGATIVE
SL AMB POCT NITRITE,UA: NEGATIVE
SL AMB POCT PH,UA: 6
SL AMB POCT SPECIFIC GRAVITY,UA: 1.01
SL AMB POCT URINE PROTEIN: NEGATIVE
SL AMB POCT UROBILINOGEN: 0.2

## 2018-03-20 PROCEDURE — 99215 OFFICE O/P EST HI 40 MIN: CPT | Performed by: NURSE PRACTITIONER

## 2018-03-20 PROCEDURE — 3079F DIAST BP 80-89 MM HG: CPT | Performed by: NURSE PRACTITIONER

## 2018-03-20 PROCEDURE — 3075F SYST BP GE 130 - 139MM HG: CPT | Performed by: NURSE PRACTITIONER

## 2018-03-20 PROCEDURE — 82948 REAGENT STRIP/BLOOD GLUCOSE: CPT | Performed by: NURSE PRACTITIONER

## 2018-03-20 PROCEDURE — 81003 URINALYSIS AUTO W/O SCOPE: CPT | Performed by: NURSE PRACTITIONER

## 2018-03-20 PROCEDURE — 4010F ACE/ARB THERAPY RXD/TAKEN: CPT | Performed by: FAMILY MEDICINE

## 2018-03-20 RX ORDER — INSULIN GLARGINE 100 [IU]/ML
10 INJECTION, SOLUTION SUBCUTANEOUS
Qty: 10 ML | Refills: 0 | Status: SHIPPED | OUTPATIENT
Start: 2018-03-20 | End: 2018-03-21 | Stop reason: CLARIF

## 2018-03-20 RX ORDER — LISINOPRIL 40 MG/1
40 TABLET ORAL DAILY
Qty: 90 TABLET | Refills: 1 | Status: SHIPPED | OUTPATIENT
Start: 2018-03-20 | End: 2019-01-05 | Stop reason: SDUPTHER

## 2018-03-20 RX ORDER — TOBRAMYCIN AND DEXAMETHASONE 3; 1 MG/ML; MG/ML
SUSPENSION/ DROPS OPHTHALMIC
COMMUNITY
Start: 2018-03-14 | End: 2018-06-20 | Stop reason: ALTCHOICE

## 2018-03-20 NOTE — PROGRESS NOTES
Sangeeta Guzman 1970 male MRN: [id number]    Family Medicine Acute Visit    ASSESSMENT/PLAN  Problem List Items Addressed This Visit     Hypertension     At goal  Continue current medication regimen         Relevant Medications    lisinopril (ZESTRIL) 40 mg tablet    Type 2 diabetes mellitus with hyperglycemia, without long-term current use of insulin ([facility] [address] ) - Primary     Today's blood sugar in office is 363  Last A1c care everywhere [facility]- 7 1 in Jan 2018, prior to that 9 1 4/2017  Next A1c ~ April  Urine dip shows 2000 glucose, negative ketones  -Continue metformin 1000 mg bid for now, plan to reduce dose if GI symptoms continue  -removed amaryl from patients med list- he hasn't been taking it and prefers to try lantus  -Start Lantus 10 units- [facility] and pharmacist will teach lantus pen use  -Follow up here 1 week for nurse visit to review BS log, lantus technique  After nurse visit will titrate lantus as indicated  Pt to bring all meds, insulin, glucose meter to nurse visit   -education- hypo and hyperglycemia, lantus- handout given  -follow up office visit is scheduled 4/12 with Dr Jessica Vasquez  -work excuse for today and recommended [facility] papers for periodic time off in order to attend medical visits  -needs further eval of DM quality care measures         Relevant Medications    metFORMIN (GLUCOPHAGE) 1000 MG tablet    insulin glargine (LANTUS) 100 units/mL subcutaneous injection    Insulin Pen Needle 31G X 5 MM MISC    Other Relevant Orders    POCT urine dip auto non-scope (Completed)    POCT blood glucose (Completed)      Other Visit Diagnoses     Type 2 diabetes mellitus without complication, without long-term current use of insulin (HCC)        Relevant Medications    metFORMIN (GLUCOPHAGE) 1000 MG tablet    insulin glargine (LANTUS) 100 units/mL subcutaneous injection            45 minutes was spent in direct care of patient      Future Appointments  Date Time Provider Beena Delgado   3/29/2018 3:00 PM [facility] FAMILY PRACTICE NURSE S [facility]   4/12/2018 5:40 PM DO TR Leahy [facility]   5/2/2018 1:10 PM Edgardo Daly MD [facility]          SUBJECTIVE  CC: Blood Sugar Problem (PT states his blood sugar was 535, PT states the lowest he had was 365 )      HPI:  Savanna Fitzpatrick is a 57 y o  male who presents for acute same day visit due to elevated blood sugars  He was last seen here in February for the same and a plan was set to increase his metformin 500 mg to 2 tabs bid  and then increase his Amaryl to 4 mg daily  A referral to endocrine and diabetes education was also ordered at that visit  He has a follow-up on April 12 with Dr Judy Gunn  Reports fasting blood sugars in the 300-400 range, and no episodes of low blood sugars  He has symptoms of hyperglycemia, including blurred vision, polyuria and polydipsia  He has also lost a significant amount of weight since January 29th- he weighed 257 then and currently weighs 241 lb  Though he did not bring his medications with him today he is certain that he is not currently taking Amaryl  He never got a RX after his hospitalization at [facility]  He doesn't want to restart amaryl, prefers to try lantus  He did increase his metformin as directed at his visit in February to 1 g twice daily-but is having intermittent diarrhea and abdominal cramping  Did not see endocrine or diabetes education r/t work schedule- difficult to get time off  Has not utilized FMLA, but thinks his employer will allow time off if FMLA is filed  Review of Systems   Constitutional: Positive for fatigue and unexpected weight change ( Weight loss as in HPI)  Negative for fever  Eyes: Positive for visual disturbance ( intermittent blurred vision)  Respiratory: Negative for shortness of breath  Cardiovascular: Negative  Gastrointestinal: Negative for vomiting  See HPI   Endocrine: Positive for polydipsia and polyuria  Negative for polyphagia  Genitourinary: Positive for frequency  Negative for dysuria  Skin: Negative for rash  Neurological: Negative for weakness, light-headedness and headaches         Historical Information   The patient history was reviewed as follows:  Past Medical History:   Diagnosis Date    Depression     last assessed - 22Oct2012    Diabetes mellitus ([facility] [address] )     Hypertension     Syncope     last assessed - [id number]    Vertigo     last assessed - [id number]         Past Surgical History:   Procedure Laterality Date    NEUROPLASTY / TRANSPOSITION MEDIAN NERVE AT CARPAL TUNNEL      TONSILLECTOMY       Family History   Problem Relation Age of Onset    Hypertension Mother      Benign Essential HTN    Heart disease Mother     Sleep apnea Mother     Diabetes Father     Heart disease Father     Hypertension Father      Benign Essential HTN      Social History   History   Alcohol Use No     Comment: social drinker     History   Drug Use No     History   Smoking Status    Never Smoker   Smokeless Tobacco    Current User     Comment: Former smoker       Medications:     Current Outpatient Prescriptions:     ACCU-CHEK FASTCLIX LANCETS MISC, by Does not apply route 2 (two) times a day, Disp: 60 each, Rfl: 5    amLODIPine (NORVASC) 10 mg tablet, Take 10 mg by mouth daily, Disp: , Rfl: 0    cyanocobalamin (CVS VITAMIN B-12) 1000 MCG tablet, Take 1,000 mcg by mouth, Disp: , Rfl:     glucose blood (ACCU-CHEK SMARTVIEW) test strip, 1 each by Other route 2 (two) times a day, Disp: 100 each, Rfl: 5    lisinopril (ZESTRIL) 40 mg tablet, Take 1 tablet (40 mg total) by mouth daily, Disp: 90 tablet, Rfl: 1    metoprolol tartrate (LOPRESSOR) 100 mg tablet, Take 1 tablet (100 mg total) by mouth 2 (two) times a day, Disp: 60 tablet, Rfl: 5    naproxen (NAPROSYN) 500 mg tablet, Take 500 mg by mouth every 12 (twelve) hours, Disp: , Rfl: 0    insulin glargine (LANTUS) 100 units/mL subcutaneous injection, Inject 10 Units under the skin daily at bedtime, Disp: 10 mL, Rfl: 0    Insulin Pen Needle 31G X 5 MM MISC, Inject under the skin daily, Disp: 90 each, Rfl: 0    metFORMIN (GLUCOPHAGE) 1000 MG tablet, Take 1 tablet (1,000 mg total) by mouth 2 (two) times a day with meals, Disp: 180 tablet, Rfl: 0    tobramycin-dexamethasone (TOBRADEX) ophthalmic suspension, , Disp: , Rfl:     No Known Allergies    OBJECTIVE  Vitals:   Vitals:    03/20/18 0854   BP: 130/82   Pulse: 78   Resp: 16   Temp: 98 °F (36 7 °C)   Weight: 109 kg (241 lb 3 2 oz)   Height: 5' 9 1" (1 755 m)         Physical Exam   Constitutional: He is oriented to person, place, and time  He appears well-developed and well-nourished  Abdominal obesity   HENT:   Head: Normocephalic and atraumatic  Mouth/Throat: Oropharynx is clear and moist    Eyes: Conjunctivae are normal  Pupils are equal, round, and reactive to light  No scleral icterus  Neck: Neck supple  No JVD present  No thyromegaly present  Cardiovascular: Normal rate, regular rhythm, normal heart sounds and intact distal pulses  Pulmonary/Chest: Effort normal and breath sounds normal    Abdominal: Soft  Bowel sounds are normal    Musculoskeletal: He exhibits no edema  Lymphadenopathy:     He has no cervical adenopathy  Neurological: He is alert and oriented to person, place, and time  Skin: Skin is warm and dry  No rash noted  Psychiatric: He has a normal mood and affect                Lamont Mata [address] [place] [address]   3/20/2018

## 2018-03-20 NOTE — ASSESSMENT & PLAN NOTE
Today's blood sugar in office is 363  Last A1c care everywhere [facility]- 7 1 in Jan 2018, prior to that 9 1 4/2017  Next A1c ~ April  Urine dip shows 2000 glucose, negative ketones  -Continue metformin 1000 mg bid for now, plan to reduce dose if GI symptoms continue  -removed amaryl from patients med list- he hasn't been taking it and prefers to try lantus  -Start Lantus 10 units- [facility] and pharmacist will teach lantus pen use  -Follow up here 1 week for nurse visit to review BS log, lantus technique  After nurse visit will titrate lantus as indicated    Pt to bring all meds, insulin, glucose meter to nurse visit   -education- hypo and hyperglycemia, lantus- handout given  -follow up office visit is scheduled 4/12 with Dr Bill Frankel  -work excuse for today and recommended [place] papers for periodic time off in order to attend medical visits  -needs further eval of DM quality care measures

## 2018-03-20 NOTE — PATIENT INSTRUCTIONS
Hypoglycemia in a Person with Diabetes   AMBULATORY CARE:   Hypoglycemia  is a serious condition that happens when your blood glucose (sugar) level drops too low  The blood sugar level is usually too high in a person with diabetes, but the level can also drop too low  It is important to follow your diabetes management plan to keep your blood sugar level steady  Common signs and symptoms include the following:   · Headache, hunger, or nervousness     · Trouble thinking or moodiness     · Sweating, or a pounding heartbeat     · Forgetfulness, confusion, or double vision     · Weakness or trouble walking     · Numbness and tingling in your fingers or around your mouth     · Seizures or loss of consciousness  Call 911 for any of the following:   · You have a seizure or pass out  · You feel you are going to pass out  · You have trouble thinking clearly  Seek care immediately if:  · Your blood sugar is less than 50 mg/dL and does not respond to treatment  Contact your healthcare provider if:   · You have had symptoms of low blood sugar several times  · You have questions about the amount of insulin or diabetes medicine you are taking  · You have questions or concerns about your condition or care  Manage hypoglycemia:   · Check your blood sugar level right away if you have symptoms of hypoglycemia  Hypoglycemia is usually 70 mg/dL or below  Ask your healthcare provider what blood sugar level is too low for you  · If your blood sugar level is too low, eat or drink 15 grams of fast-acting carbohydrate  Examples of this amount of fast-acting carbohydrate are 4 ounces (½ cup) of fruit juice or 4 ounces of regular soda  Other examples are 2 tablespoons of raisins or 3 to 4 glucose tablets  Check your blood sugar level 15 minutes later  If the level is still low (less than 100 mg/dL), have another 15 grams of carbohydrate   When the level returns to 100 mg/dL, eat a snack or meal that contains carbohydrates  This will help prevent another drop in blood sugar  Always carefully follow your healthcare provider's instructions on how to treat low blood sugar levels  · Always carry a source of fast-acting carbohydrate  If you have symptoms of hypoglycemia and you do not have a blood glucose meter, have a source of fast-acting carbohydrate anyway  Avoid carbohydrate foods that are high in fat  The fat content may make it take longer to increase your blood sugar level  Ask your healthcare provider if you should carry a glucagon kit  Glucagon is a medicine that is injected when you develop severe hypoglycemia and become unconscious  Check the expiration date every month and replace it before it expires  · Teach others how to help you if you have symptoms of hypoglycemia  Tell them about the symptoms of hypoglycemia  Ask them to give you a source of fast-acting carbohydrate if you cannot get it yourself  Ask them to give you a glucagon injection if you have symptoms of hypoglycemia and you become unconscious or have a seizure  Ask them to call 911   This is an emergency  Tell them never to try to make you swallow anything if you faint or have a seizure  · Wear medical alert jewelry  or carry a card that says you have diabetes  Ask where to get these items  Prevent hypoglycemia:   · Take diabetes medicine as directed  Take your medicine at the right time and in the right amount  Your healthcare provider may change your blood sugar goals if you get hypoglycemia often  · Eat regular meals and snacks  Talk to your dietitian or healthcare provider about a meal plan that is right for you  Do not skip meals  · Check your blood sugar level as directed  Ask your healthcare provider what your blood sugar levels should be before and after you eat  Ask when and how often to check your blood sugar level  You may need to check at least 3 times each day   Record your blood sugar level results and take the record with you when you see your healthcare provider  Your provider may use the record to make changes to your medicine, food, or exercise schedules  · Check your blood sugar level before you exercise  Exercise can decrease your blood sugar level  If your blood sugar level is less than 100 mg/dL, have a carbohydrate snack  Examples are 4 to 6 crackers, ½ banana, 8 ounces (1 cup) of nonfat or 1% milk, or 4 ounces (½ cup) of juice  If you will exercise for more than 1 hour, you may need to check your blood sugar level every 30 minutes  Your healthcare provider may also recommend that you check your blood sugar level after exercise  · Be aware of how alcohol affects your blood sugar level  Alcohol can cause your blood sugar level to drop for up to 12 hours after drinking  Ask your healthcare provider if alcohol is safe for you  If you drink alcohol, always have a snack or meal at the same time  Women should limit alcohol to 1 drink a day  Men should limit alcohol to 2 drinks a day  A drink of alcohol is 12 ounces of beer, 5 ounces of wine, or 1½ ounces of liquor  Follow up with your healthcare provider as directed: You may need dose changes to your insulin or oral diabetes medicine if you have hypoglycemia  Write down your questions so you remember to ask them during your visits  © 2017 [address]  Information is for End User's use only and may not be sold, redistributed or otherwise used for commercial purposes  All illustrations and images included in CareNotes® are the copyrighted property of [employer]  or [employer]  The above information is an  only  It is not intended as medical advice for individual conditions or treatments  Talk to your doctor, nurse or pharmacist before following any medical regimen to see if it is safe and effective for you      Diabetic Hyperglycemia, Ambulatory Care   GENERAL INFORMATION:   Diabetic hyperglycemia  is a blood glucose (sugar) level that is higher than your healthcare provider recommends  You may not have any signs and symptoms  You may have increased thirst and urinate more often than usual   Seek immediate care for the following symptoms:   · Shortness of breath    · Breath that smells fruity    · Nausea and vomiting    · Symptoms of dehydration, such as dark yellow urine, dry mouth and lips, and dry skin  Manage diabetic hyperglycemia:   · If you take diabetes medicine or insulin, take it as directed  Missed or wrong doses can cause your blood sugar to go up  · Tell your healthcare provider if you continue to have trouble managing your blood sugar  He may change the type, amount, or timing of your diabetes medicine or insulin  If you do not take diabetes medicine or insulin, you may need to start  · Work with your healthcare provider to develop a sick day plan  Illness can cause your blood sugar to rise  A sick day plan helps you control your blood sugar level when you are sick  Prevent diabetic hyperglycemia:   · Check your blood sugar levels regularly  Ask your healthcare provider how often to check your blood sugar and what your levels should be  · Follow your meal plan  Your blood sugar can go up if you eat a large meal or you eat more carbohydrates than recommended  Work with a dietitian to develop a meal plan that is right for you  · Exercise  can help lower your blood sugar when it is high  It can also keep your blood sugar levels steady over time  Exercise for at least 30 minutes, 5 days a week  Include muscle strengthening activities 2 days each week  Work with your healthcare provider to create an exercise plan  Children should get at least 60 minutes of physical activity each day  · Check your ketones before exercise  if your blood sugar level is above 240 mg/dl  Do not exercise if you have ketones in your urine, because your blood sugar level may rise even more   Ask your healthcare provider how to lower your blood sugar when you have ketones  Follow up with your healthcare provider as directed:  Write down your questions so you remember to ask them during your visits  CARE AGREEMENT:   You have the right to help plan your care  Learn about your health condition and how it may be treated  Discuss treatment options with your caregivers to decide what care you want to receive  You always have the right to refuse treatment  The above information is an  only  It is not intended as medical advice for individual conditions or treatments  Talk to your doctor, nurse or pharmacist before following any medical regimen to see if it is safe and effective for you  © 2014 [address] is for End User's use only and may not be sold, redistributed or otherwise used for commercial purposes  All illustrations and images included in CareNotes® are the copyrighted property of [employer] M [employer]  or Davon Sidhu  Insulin Glargine (By injection)   Insulin Glargine, Recombinant (IN-wolfe-marychuy GLAR-evelyn, pranav-KOM-lamar-toy)  Treats diabetes  Brand Name(s): Basaglar, Lantus, Lantus SoloStar, Toujeo   There may be other brand names for this medicine  When This Medicine Should Not Be Used: This medicine is not right for everyone  Do not use it if you had an allergic reaction to insulin glargine  How to Use This Medicine:   Injectable  · Your healthcare provider will work with you to personalize your dose and treatment based on your insulin needs and lifestyle  You will be taught how to give yourself the injections  Make sure you understand all instructions  Ask the doctor, nurse, or pharmacist if you have questions  · If you use insulin once a day, it is best to use it at about the same time every day  · Always double-check both the concentration (strength) of your insulin and your dose  Concentration and dose are not the same   The dose is how many units of insulin you will use  The concentration tells how many units of insulin are in each milliliter (mL), such as 100 units/mL (U-100), but this does not mean you will use 100 units at a time  · Read and follow the patient instructions that come with this medicine  Talk to your doctor or pharmacist if you have any questions  · This medicine should look clear before you use it  Do not shake the vial  Do not mix this medicine with any other insulin or with water  · You will be shown the body areas where this shot can be given  Use a different body area each time you give yourself a shot  Keep track of where you give each shot to make sure you rotate body areas  · Use a new needle and syringe each time you inject your medicine  If you use a syringe, use only the kind that is made for insulin injections  Some insulin must be given with a specific type of syringe or needle  Ask your pharmacist if you are not sure which one to use  · Always check the label before use, to make sure you have the correct type of insulin  Do not change the brand, type, or concentration unless your doctor tells you to  If you use a pump or other device, make sure the insulin is made for that device  · Unopened medicine: Store the vials, cartridges, and SoloStar® pens in the refrigerator  Protect from light  Do not freeze  · Opened medicine:   ¨ Vials: Store in the refrigerator or at room temperature in a cool place, away from sunlight and heat  Use within 28 days  ¨ Cartridge or [employer]: Store at room temperature, away from direct heat and light  Do not refrigerate  Throw away any opened cartridge or Lantus® SoloStar® pen after 28 days  Throw away any opened [employer] after 42 days  · Throw away used needles in a hard, closed container that the needles cannot poke through  Keep this container away from children and pets    Drugs and Foods to Avoid:   Ask your doctor or pharmacist before using any other medicine, including over-the-counter medicines, vitamins, and herbal products  · Some medicines can change the amount of insulin you need to use and make it harder for you to control your diabetes  Tell your doctor about all other medicines that you are using  · Do not drink alcohol while you are using this medicine  Warnings While Using This Medicine:   · Tell your doctor if you are pregnant or breastfeeding, or if you have kidney disease, liver disease, heart disease, or heart failure  · This medicine may cause the following problems:  ¨ Low blood sugar or low potassium levels in the blood  ¨ Fluid retention or heart failure (when used with thiazolidinedione [TZD] medicine)  · Never share insulin pens, needles, or cartridges with anyone  Sharing these can pass hepatitis viruses, HIV, or other illnesses from one person to another  · Keep all medicine out of the reach of children  Never share your medicine with anyone  Possible Side Effects While Using This Medicine:   Call your doctor right away if you notice any of these side effects:  · Allergic reaction: Itching or hives, swelling in your face or hands, swelling or tingling in your mouth or throat, chest tightness, trouble breathing  · Dry mouth, increased thirst, muscle cramps, nausea or vomiting, uneven heartbeat  · Rapid weight gain, swelling in your hands, ankles, or feet, trouble breathing, tiredness  · Shaking, trembling, sweating, fast or pounding heartbeat, lightheadedness, hunger, confusion  If you notice these less serious side effects, talk with your doctor:   · Redness, pain, itching, swelling, or any skin changes where the shot was given  If you notice other side effects that you think are caused by this medicine, tell your doctor  Call your doctor for medical advice about side effects   You may report side effects to FDA at 4-227-FDA-1993  © 2017 [address] Information is for End User's use only and may not be sold, redistributed or otherwise used for commercial purposes  The above information is an  only  It is not intended as medical advice for individual conditions or treatments  Talk to your doctor, nurse or pharmacist before following any medical regimen to see if it is safe and effective for you

## 2018-03-20 NOTE — LETTER
March 20, 2018     Patient: Genny Fernandes   YOB: 1970   Date of Visit: 3/20/2018       To Whom it May Concern:    Dragan Headley is under my professional care  He was seen in my office on 3/20/2018  He may return to work on 3/21/2018  He will require periodic time off to go to medical appointments  Please give him FMLA papers for us to complete  He will need time off next week for a follow up appt here  If you have any questions or concerns, please don't hesitate to call           Sincerely,          PEDRO Glass        CC: No Recipients

## 2018-03-20 NOTE — TELEPHONE ENCOUNTER
Patient seen today, Lantus vials were ordered, Patient looking for pen  Please change to pen and order pen needles as well  Thanks! Pharmacy left another voice message, they need clarification on Metformin

## 2018-03-21 DIAGNOSIS — E13.9 DIABETES 1.5, MANAGED AS TYPE 2 (HCC): Primary | ICD-10-CM

## 2018-06-20 ENCOUNTER — OFFICE VISIT (OUTPATIENT)
Dept: FAMILY MEDICINE CLINIC | Facility: CLINIC | Age: 48
End: 2018-06-20
Payer: COMMERCIAL

## 2018-06-20 VITALS
HEIGHT: 69 IN | BODY MASS INDEX: 33.92 KG/M2 | SYSTOLIC BLOOD PRESSURE: 130 MMHG | HEART RATE: 80 BPM | RESPIRATION RATE: 18 BRPM | TEMPERATURE: 99.2 F | DIASTOLIC BLOOD PRESSURE: 88 MMHG | WEIGHT: 229 LBS

## 2018-06-20 DIAGNOSIS — B96.89 ACUTE BACTERIAL SINUSITIS: ICD-10-CM

## 2018-06-20 DIAGNOSIS — Z79.4 TYPE 2 DIABETES MELLITUS WITHOUT COMPLICATION, WITH LONG-TERM CURRENT USE OF INSULIN (HCC): Chronic | ICD-10-CM

## 2018-06-20 DIAGNOSIS — E11.65 TYPE 2 DIABETES MELLITUS WITH HYPERGLYCEMIA, WITHOUT LONG-TERM CURRENT USE OF INSULIN (HCC): ICD-10-CM

## 2018-06-20 DIAGNOSIS — E11.9 TYPE 2 DIABETES MELLITUS WITHOUT COMPLICATION, WITH LONG-TERM CURRENT USE OF INSULIN (HCC): Chronic | ICD-10-CM

## 2018-06-20 DIAGNOSIS — J01.90 ACUTE BACTERIAL SINUSITIS: ICD-10-CM

## 2018-06-20 DIAGNOSIS — H66.90 ACUTE OTITIS MEDIA, UNSPECIFIED OTITIS MEDIA TYPE: ICD-10-CM

## 2018-06-20 PROCEDURE — 3008F BODY MASS INDEX DOCD: CPT | Performed by: NURSE PRACTITIONER

## 2018-06-20 PROCEDURE — 99214 OFFICE O/P EST MOD 30 MIN: CPT | Performed by: NURSE PRACTITIONER

## 2018-06-20 RX ORDER — CEFDINIR 300 MG/1
300 CAPSULE ORAL EVERY 12 HOURS SCHEDULED
Qty: 14 CAPSULE | Refills: 0 | Status: SHIPPED | OUTPATIENT
Start: 2018-06-20 | End: 2018-06-27

## 2018-06-20 RX ORDER — CIPROFLOXACIN AND DEXAMETHASONE 3; 1 MG/ML; MG/ML
4 SUSPENSION/ DROPS AURICULAR (OTIC) 2 TIMES DAILY
Qty: 7.5 ML | Refills: 0 | Status: SHIPPED | OUTPATIENT
Start: 2018-06-20 | End: 2019-07-11 | Stop reason: ALTCHOICE

## 2018-06-20 NOTE — LETTER
June 20, 2018     Patient: Elissa Coates   YOB: 1970   Date of Visit: 6/20/2018       To Whom it May Concern:    Hernán Camacho is under my professional care  He was seen in my office on 6/20/2018  He may return to work on June 21  If you have any questions or concerns, please don't hesitate to call           Sincerely,          PEDRO Trinidad        CC: No Recipients

## 2018-06-20 NOTE — PATIENT INSTRUCTIONS
I increase insulin to 30 units at bed time  Test BS in the AM and it should come down to 100-130  Decrease metformin to 1000 mg a day in the AM   I also RX ear drops and an antibiotic  If your BS goes too low, drink soda or eat sugar   Keep a log of your BS #'s

## 2018-06-20 NOTE — PROGRESS NOTES
Assessment/Plan:    Acute bacterial sinusitis  Will start on cefdinir for 7 days    Acute otitis media  Cefdinir and cipro otic drops    Type 2 diabetes mellitus with hyperglycemia, without long-term current use of insulin ([facility])  Lab Results   Component Value Date    HGBA1C 9 1 (H) 04/09/2017         Type 2 diabetes mellitus without complication, with long-term current use of insulin ([facility])  Lab Results   Component Value Date    HGBA1C 9 1 (H) 04/09/2017     Metformin decreased to 1000 mg daily due to non compliance and side effects  Increased long acting insulin to 30 units at hs with goal of -130 mg/dL  Keep logs and will adjust as needed  Problem List Items Addressed This Visit     Acute otitis media     Cefdinir and cipro otic drops         Relevant Medications    ciprofloxacin-dexamethasone (CIPRODEX) otic suspension    cefdinir (OMNICEF) 300 mg capsule    Type 2 diabetes mellitus without complication, with long-term current use of insulin ([facility])     Lab Results   Component Value Date    HGBA1C 9 1 (H) 04/09/2017     Metformin decreased to 1000 mg daily due to non compliance and side effects  Increased long acting insulin to 30 units at hs with goal of -130 mg/dL  Keep logs and will adjust as needed  Relevant Medications    metFORMIN (GLUCOPHAGE) 1000 MG tablet    insulin glargine (LANTUS SOLOSTAR) 100 units/mL injection pen    Other Relevant Orders    HEMOGLOBIN A1C W/ EAG ESTIMATION    Acute bacterial sinusitis     Will start on cefdinir for 7 days         Relevant Medications    cefdinir (OMNICEF) 300 mg capsule            Subjective:      Patient ID: Monico Hudson is a 57 y o  male  3 57year old presents c/o right ear pain, sore throat and nasal congestion  Symptoms began 2 days ago  He was swimming 3 days ago and the next day he woke up with symptoms  His sinus congestion began a week ago  Using Tylenol with minimal relief      2  States he can only tolerate metformin once a day due to GI upset  He is on lantus 10 units at hs and BS are in the 375 range all time time  The following portions of the patient's history were reviewed and updated as appropriate: allergies, current medications, past family history, past medical history, past social history, past surgical history and problem list     Review of Systems   Constitutional: Positive for chills and fever  HENT: Positive for congestion, sinus pressure and sore throat  Eyes: Positive for pain  Respiratory: Positive for cough  Cardiovascular: Negative  Gastrointestinal: Negative  Objective:      /88   Pulse 80   Temp 99 2 °F (37 3 °C)   Resp 18   Ht 5' 9" (1 753 m)   Wt 104 kg (229 lb)   BMI 33 82 kg/m²          Physical Exam   Constitutional: He appears well-developed and well-nourished  HENT:   Head: Normocephalic and atraumatic  Right Ear: Hearing normal  There is drainage, swelling and tenderness  Tympanic membrane is erythematous  A middle ear effusion is present  Left Ear: Hearing, tympanic membrane, external ear and ear canal normal    Nose: Mucosal edema, rhinorrhea and sinus tenderness present  Right sinus exhibits maxillary sinus tenderness  Left sinus exhibits maxillary sinus tenderness  Cardiovascular: Normal rate and regular rhythm  Pulmonary/Chest: Effort normal and breath sounds normal    Skin: Skin is warm and dry  Psychiatric: He has a normal mood and affect

## 2018-06-21 NOTE — ASSESSMENT & PLAN NOTE
Lab Results   Component Value Date    HGBA1C 9 1 (H) 04/09/2017     Metformin decreased to 1000 mg daily due to non compliance and side effects  Increased long acting insulin to 30 units at hs with goal of -130 mg/dL  Keep logs and will adjust as needed

## 2018-07-23 DIAGNOSIS — I10 HYPERTENSION, UNSPECIFIED TYPE: ICD-10-CM

## 2018-07-23 RX ORDER — METOPROLOL TARTRATE 100 MG/1
TABLET ORAL
Qty: 60 TABLET | Refills: 5 | Status: CANCELLED | OUTPATIENT
Start: 2018-07-23

## 2018-07-24 DIAGNOSIS — I10 HYPERTENSION, UNSPECIFIED TYPE: ICD-10-CM

## 2018-07-24 RX ORDER — METOPROLOL TARTRATE 100 MG/1
100 TABLET ORAL 2 TIMES DAILY
Qty: 60 TABLET | Refills: 5 | Status: SHIPPED | OUTPATIENT
Start: 2018-07-24 | End: 2019-03-16 | Stop reason: SDUPTHER

## 2018-10-17 ENCOUNTER — OFFICE VISIT (OUTPATIENT)
Dept: FAMILY MEDICINE CLINIC | Facility: CLINIC | Age: 48
End: 2018-10-17
Payer: COMMERCIAL

## 2018-10-17 VITALS
BODY MASS INDEX: 36.35 KG/M2 | RESPIRATION RATE: 16 BRPM | SYSTOLIC BLOOD PRESSURE: 140 MMHG | HEART RATE: 74 BPM | TEMPERATURE: 98.4 F | HEIGHT: 69 IN | DIASTOLIC BLOOD PRESSURE: 80 MMHG | WEIGHT: 245.4 LBS

## 2018-10-17 DIAGNOSIS — Z80.0 FAMILY HISTORY OF COLON CANCER REQUIRING SCREENING COLONOSCOPY: ICD-10-CM

## 2018-10-17 DIAGNOSIS — T38.3X5D ADVERSE EFFECT OF METFORMIN, SUBSEQUENT ENCOUNTER: ICD-10-CM

## 2018-10-17 DIAGNOSIS — G89.29 CHRONIC ABDOMINAL PAIN: ICD-10-CM

## 2018-10-17 DIAGNOSIS — E11.9 TYPE 2 DIABETES MELLITUS WITHOUT COMPLICATION, WITH LONG-TERM CURRENT USE OF INSULIN (HCC): Chronic | ICD-10-CM

## 2018-10-17 DIAGNOSIS — Z79.4 TYPE 2 DIABETES MELLITUS WITHOUT COMPLICATION, WITH LONG-TERM CURRENT USE OF INSULIN (HCC): Chronic | ICD-10-CM

## 2018-10-17 DIAGNOSIS — R10.9 CHRONIC ABDOMINAL PAIN: ICD-10-CM

## 2018-10-17 DIAGNOSIS — E11.65 TYPE 2 DIABETES MELLITUS WITH HYPERGLYCEMIA, WITHOUT LONG-TERM CURRENT USE OF INSULIN (HCC): Primary | ICD-10-CM

## 2018-10-17 PROBLEM — T38.3X5A METFORMIN ADVERSE REACTION: Status: ACTIVE | Noted: 2018-10-17

## 2018-10-17 PROCEDURE — 3008F BODY MASS INDEX DOCD: CPT | Performed by: STUDENT IN AN ORGANIZED HEALTH CARE EDUCATION/TRAINING PROGRAM

## 2018-10-17 PROCEDURE — 99213 OFFICE O/P EST LOW 20 MIN: CPT | Performed by: STUDENT IN AN ORGANIZED HEALTH CARE EDUCATION/TRAINING PROGRAM

## 2018-10-17 PROCEDURE — 1036F TOBACCO NON-USER: CPT | Performed by: STUDENT IN AN ORGANIZED HEALTH CARE EDUCATION/TRAINING PROGRAM

## 2018-10-17 RX ORDER — CYCLOBENZAPRINE HCL 10 MG
10 TABLET ORAL
Refills: 0 | COMMUNITY
Start: 2018-08-23 | End: 2020-07-07 | Stop reason: ALTCHOICE

## 2018-10-17 RX ORDER — IBUPROFEN 800 MG/1
800 TABLET ORAL 3 TIMES DAILY PRN
Refills: 0 | COMMUNITY
Start: 2018-09-10 | End: 2020-07-07 | Stop reason: ALTCHOICE

## 2018-10-17 NOTE — PROGRESS NOTES
Assessment/Plan:     Diagnoses and all orders for this visit:    Type 2 diabetes mellitus with hyperglycemia, without long-term current use of insulin (HCC)  -     Hemoglobin A1C; Future  -     Basic metabolic panel; Future  -     CBC and differential; Future  -     Lipid panel; Future  -     Microalbumin / creatinine urine ratio; Future    Adverse effect of metformin, subsequent encounter    Type 2 diabetes mellitus without complication, with long-term current use of insulin ([facility])  Comments:      Orders:  -     insulin glargine (LANTUS SOLOSTAR) 100 units/mL injection pen; Inject 15 Units under the skin daily at bedtime    Chronic abdominal pain  -     Ambulatory referral to Gastroenterology; Future    Family history of colon cancer requiring screening colonoscopy  -     Ambulatory referral to Gastroenterology; Future    Other orders  -     cyclobenzaprine (FLEXERIL) 10 mg tablet; Take 10 mg by mouth daily at bedtime as needed  -     ibuprofen (MOTRIN) 800 mg tablet; Take 800 mg by mouth 3 (three) times a day as needed Take with food        Jerson's chronic abdominal pain concerns appear to be related to adverse reaction to Metformin, however, there is a family history of colon cancer and Ela Robles has not had a screening colonoscopy  GI referral provided today  In terms of diabetes, he was advised to stop metformin  We will initiate insulin therapy at half dose of previous of his insulin dose  In the meantime, he will obtain diabetic lab work and keep a blood sugar log  Long discussion also had related to lifestyle modifications related to diet and exercise  He will return to the office in 2 weeks to discuss lab work and make necessary adjustments  Ela Robles and his wife acknowledged understanding and agreement with the plan  Subjective:      Patient ID: Esha Koehler is a 57 y o  male  HPI    Ela Robles is a 57year old male that comes to the office due to concerns of chronic abdominal pain and diabetes      He recently went to the ER at [facility] on 10/14/18 due to concerns of diarrhea  He reports having symptoms of diarrhea for approximately one year after he started metformin for diabetes  He has had dose adjustments and reports his symptoms have correlated with the dose adjustments (symptom improvement with dosage decreases, worse with dosage increases)  He also reports a history colon cancer in his family (aunt and uncle), unsure of age of diagnosis  His abdominal pain is located on the LUQ, intermittent, pressure-like in quality, severe intensity at its worst, sometimes radiates to the back  He also reports history of hemorrhoids  He has not had a colonoscopy performed as of yet  Reports history of unexpected weight loss approximately one year ago (approx 30 lbs), but gained it back  Abdominal pain is not related to the specific foods  No close contacts with similar symptoms  No history of cholecystectomy or appendectomy  He did have a CT scan performed at his recent ER visit  Further denies fever, chills, cold-like symptoms, N/V  In terms of his diabetes, he stopped taking metformin (advised by ER) and has had improvement in his symptoms  Admits to not having good control of diabetes  He used to be on insulin therapy, but has not been for a while  Has not had recent diabetic blood work  The following portions of the patient's history were reviewed and updated as appropriate: allergies, current medications, past family history, past medical history, past social history, past surgical history and problem list     Review of Systems   Constitutional: Positive for unexpected weight change  Negative for chills and fever  HENT: Negative for congestion, ear pain, rhinorrhea and sore throat  Eyes: Negative for visual disturbance  Respiratory: Negative for cough and shortness of breath  Cardiovascular: Negative for chest pain and palpitations  Gastrointestinal: Positive for abdominal pain and diarrhea   Negative for constipation, nausea and vomiting  Endocrine: Positive for polydipsia and polyuria  Genitourinary: Negative for decreased urine volume and dysuria  Skin: Negative for rash  Neurological: Negative for headaches  Psychiatric/Behavioral: Negative for behavioral problems  Objective:      /80 (BP Location: Left arm, Patient Position: Sitting, Cuff Size: Large)   Pulse 74   Temp 98 4 °F (36 9 °C) (Tympanic)   Resp 16   Ht 5' 9" (1 753 m)   Wt 111 kg (245 lb 6 4 oz)   BMI 36 24 kg/m²          Physical Exam   Constitutional: He is oriented to person, place, and time  He appears well-developed and well-nourished  No distress  HENT:   Head: Normocephalic and atraumatic  Right Ear: External ear normal    Left Ear: External ear normal    Eyes: Pupils are equal, round, and reactive to light  Conjunctivae and EOM are normal  Right eye exhibits no discharge  Left eye exhibits no discharge  Neck: Neck supple  Cardiovascular: Normal rate, regular rhythm and normal heart sounds  No murmur heard  Pulmonary/Chest: Effort normal and breath sounds normal  No respiratory distress  He has no wheezes  Abdominal: Soft  Bowel sounds are normal  He exhibits no distension and no mass  There is no tenderness  There is no rebound and no guarding  No CVA tenderness   Musculoskeletal: Normal range of motion  He exhibits no edema or tenderness  Neurological: He is alert and oriented to person, place, and time  Skin: Skin is warm and dry  He is not diaphoretic  Psychiatric: He has a normal mood and affect

## 2018-11-10 DIAGNOSIS — E11.65 TYPE 2 DIABETES MELLITUS WITH HYPERGLYCEMIA, WITHOUT LONG-TERM CURRENT USE OF INSULIN (HCC): ICD-10-CM

## 2018-11-12 RX ORDER — PEN NEEDLE, DIABETIC 31 GX5/16"
NEEDLE, DISPOSABLE MISCELLANEOUS
Qty: 90 EACH | Refills: 0 | Status: SHIPPED | OUTPATIENT
Start: 2018-11-12 | End: 2019-05-23 | Stop reason: SDUPTHER

## 2018-12-16 DIAGNOSIS — Z79.4 TYPE 2 DIABETES MELLITUS WITHOUT COMPLICATION, WITH LONG-TERM CURRENT USE OF INSULIN (HCC): Chronic | ICD-10-CM

## 2018-12-16 DIAGNOSIS — E11.9 TYPE 2 DIABETES MELLITUS WITHOUT COMPLICATION, WITH LONG-TERM CURRENT USE OF INSULIN (HCC): Chronic | ICD-10-CM

## 2018-12-17 RX ORDER — INSULIN GLARGINE 100 [IU]/ML
INJECTION, SOLUTION SUBCUTANEOUS
Qty: 6 ML | Refills: 2 | Status: SHIPPED | OUTPATIENT
Start: 2018-12-17 | End: 2019-07-11 | Stop reason: SDUPTHER

## 2019-01-05 DIAGNOSIS — I10 HYPERTENSION, UNSPECIFIED TYPE: ICD-10-CM

## 2019-01-07 RX ORDER — LISINOPRIL 40 MG/1
TABLET ORAL
Qty: 90 TABLET | Refills: 2 | Status: SHIPPED | OUTPATIENT
Start: 2019-01-07 | End: 2019-01-11 | Stop reason: SDUPTHER

## 2019-01-11 DIAGNOSIS — I10 HYPERTENSION, UNSPECIFIED TYPE: ICD-10-CM

## 2019-01-14 RX ORDER — LISINOPRIL 40 MG/1
40 TABLET ORAL DAILY
Qty: 90 TABLET | Refills: 0 | Status: SHIPPED | OUTPATIENT
Start: 2019-01-14 | End: 2019-07-11 | Stop reason: SDUPTHER

## 2019-01-18 RX ORDER — OMEPRAZOLE 40 MG/1
CAPSULE, DELAYED RELEASE ORAL
Refills: 0 | COMMUNITY
Start: 2019-01-04 | End: 2019-07-11 | Stop reason: SDUPTHER

## 2019-01-18 RX ORDER — HYDROCORTISONE ACETATE 25 MG/1
SUPPOSITORY RECTAL
Refills: 0 | COMMUNITY
Start: 2018-10-30 | End: 2019-07-11 | Stop reason: SDUPTHER

## 2019-01-18 RX ORDER — METHOCARBAMOL 750 MG/1
TABLET, FILM COATED ORAL
COMMUNITY
Start: 2018-11-20 | End: 2020-07-07 | Stop reason: ALTCHOICE

## 2019-01-21 DIAGNOSIS — E11.9 TYPE 2 DIABETES MELLITUS WITHOUT COMPLICATION, WITH LONG-TERM CURRENT USE OF INSULIN (HCC): Chronic | ICD-10-CM

## 2019-01-21 DIAGNOSIS — Z79.4 TYPE 2 DIABETES MELLITUS WITHOUT COMPLICATION, WITH LONG-TERM CURRENT USE OF INSULIN (HCC): Chronic | ICD-10-CM

## 2019-01-22 NOTE — TELEPHONE ENCOUNTER
Patient should make appointment for follow up prior to approving refill for metformin  He had pending blood work and change in diabetic medications when I last saw him months ago without subsequent follow up  Thank you

## 2019-03-14 DIAGNOSIS — E11.9 TYPE 2 DIABETES MELLITUS WITHOUT COMPLICATION, WITHOUT LONG-TERM CURRENT USE OF INSULIN (HCC): ICD-10-CM

## 2019-03-14 RX ORDER — BLOOD SUGAR DIAGNOSTIC
STRIP MISCELLANEOUS
Qty: 60 EACH | Refills: 5 | Status: SHIPPED | OUTPATIENT
Start: 2019-03-14

## 2019-03-16 DIAGNOSIS — I10 HYPERTENSION, UNSPECIFIED TYPE: ICD-10-CM

## 2019-03-18 RX ORDER — METOPROLOL TARTRATE 100 MG/1
TABLET ORAL
Qty: 60 TABLET | Refills: 5 | Status: SHIPPED | OUTPATIENT
Start: 2019-03-18 | End: 2019-07-11 | Stop reason: SDUPTHER

## 2019-04-12 DIAGNOSIS — I10 HYPERTENSION, UNSPECIFIED TYPE: ICD-10-CM

## 2019-04-19 RX ORDER — LISINOPRIL 40 MG/1
40 TABLET ORAL DAILY
Qty: 90 TABLET | Refills: 0 | OUTPATIENT
Start: 2019-04-19

## 2019-05-23 DIAGNOSIS — E11.65 TYPE 2 DIABETES MELLITUS WITH HYPERGLYCEMIA, WITHOUT LONG-TERM CURRENT USE OF INSULIN (HCC): ICD-10-CM

## 2019-05-28 RX ORDER — PEN NEEDLE, DIABETIC 31 GX5/16"
NEEDLE, DISPOSABLE MISCELLANEOUS
Qty: 90 EACH | Refills: 0 | Status: SHIPPED | OUTPATIENT
Start: 2019-05-28 | End: 2020-05-05

## 2019-07-08 RX ORDER — SERTRALINE HYDROCHLORIDE 100 MG/1
TABLET, FILM COATED ORAL
Refills: 0 | COMMUNITY
Start: 2019-05-05 | End: 2019-07-11 | Stop reason: SDUPTHER

## 2019-07-11 ENCOUNTER — OFFICE VISIT (OUTPATIENT)
Dept: FAMILY MEDICINE CLINIC | Facility: CLINIC | Age: 49
End: 2019-07-11
Payer: COMMERCIAL

## 2019-07-11 VITALS
OXYGEN SATURATION: 98 % | DIASTOLIC BLOOD PRESSURE: 90 MMHG | HEIGHT: 69 IN | RESPIRATION RATE: 18 BRPM | TEMPERATURE: 97.4 F | HEART RATE: 64 BPM | SYSTOLIC BLOOD PRESSURE: 140 MMHG | WEIGHT: 247 LBS | BODY MASS INDEX: 36.58 KG/M2

## 2019-07-11 DIAGNOSIS — E11.9 TYPE 2 DIABETES MELLITUS WITHOUT COMPLICATION, WITH LONG-TERM CURRENT USE OF INSULIN (HCC): Primary | ICD-10-CM

## 2019-07-11 DIAGNOSIS — N52.9 ERECTILE DYSFUNCTION, UNSPECIFIED ERECTILE DYSFUNCTION TYPE: ICD-10-CM

## 2019-07-11 DIAGNOSIS — F32.A DEPRESSION, UNSPECIFIED DEPRESSION TYPE: ICD-10-CM

## 2019-07-11 DIAGNOSIS — E11.9 TYPE 2 DIABETES MELLITUS WITHOUT COMPLICATION, WITH LONG-TERM CURRENT USE OF INSULIN (HCC): Chronic | ICD-10-CM

## 2019-07-11 DIAGNOSIS — E55.9 VITAMIN D DEFICIENCY: ICD-10-CM

## 2019-07-11 DIAGNOSIS — K21.9 GASTROESOPHAGEAL REFLUX DISEASE WITHOUT ESOPHAGITIS: ICD-10-CM

## 2019-07-11 DIAGNOSIS — K64.9 HEMORRHOIDS, UNSPECIFIED HEMORRHOID TYPE: ICD-10-CM

## 2019-07-11 DIAGNOSIS — Z79.4 TYPE 2 DIABETES MELLITUS WITHOUT COMPLICATION, WITH LONG-TERM CURRENT USE OF INSULIN (HCC): Chronic | ICD-10-CM

## 2019-07-11 DIAGNOSIS — Z79.4 TYPE 2 DIABETES MELLITUS WITHOUT COMPLICATION, WITH LONG-TERM CURRENT USE OF INSULIN (HCC): Primary | ICD-10-CM

## 2019-07-11 DIAGNOSIS — I10 HYPERTENSION, UNSPECIFIED TYPE: ICD-10-CM

## 2019-07-11 DIAGNOSIS — Z13.220 SCREENING FOR CHOLESTEROL LEVEL: ICD-10-CM

## 2019-07-11 DIAGNOSIS — E66.9 OBESITY (BMI 35.0-39.9 WITHOUT COMORBIDITY): ICD-10-CM

## 2019-07-11 DIAGNOSIS — E78.2 MIXED HYPERLIPIDEMIA: ICD-10-CM

## 2019-07-11 PROBLEM — F33.9 EPISODE OF RECURRENT MAJOR DEPRESSIVE DISORDER (HCC): Status: ACTIVE | Noted: 2019-02-12

## 2019-07-11 PROBLEM — K64.8 OTHER HEMORRHOIDS: Status: ACTIVE | Noted: 2019-01-07

## 2019-07-11 LAB
CREAT UR-MCNC: 38.5 MG/DL
MICROALBUMIN UR-MCNC: 105 MG/L (ref 0–20)
MICROALBUMIN/CREAT 24H UR: 273 MG/G CREATININE (ref 0–30)
SL AMB POCT HEMOGLOBIN AIC: 9.1 (ref ?–6.5)

## 2019-07-11 PROCEDURE — 3046F HEMOGLOBIN A1C LEVEL >9.0%: CPT | Performed by: NURSE PRACTITIONER

## 2019-07-11 PROCEDURE — 83036 HEMOGLOBIN GLYCOSYLATED A1C: CPT | Performed by: NURSE PRACTITIONER

## 2019-07-11 PROCEDURE — 3060F POS MICROALBUMINURIA REV: CPT | Performed by: NURSE PRACTITIONER

## 2019-07-11 PROCEDURE — 4010F ACE/ARB THERAPY RXD/TAKEN: CPT | Performed by: NURSE PRACTITIONER

## 2019-07-11 PROCEDURE — 99214 OFFICE O/P EST MOD 30 MIN: CPT | Performed by: NURSE PRACTITIONER

## 2019-07-11 PROCEDURE — 82570 ASSAY OF URINE CREATININE: CPT | Performed by: NURSE PRACTITIONER

## 2019-07-11 PROCEDURE — 82043 UR ALBUMIN QUANTITATIVE: CPT | Performed by: NURSE PRACTITIONER

## 2019-07-11 RX ORDER — SERTRALINE HYDROCHLORIDE 100 MG/1
100 TABLET, FILM COATED ORAL DAILY
Qty: 30 TABLET | Refills: 5 | Status: SHIPPED | OUTPATIENT
Start: 2019-07-11 | End: 2020-01-10 | Stop reason: SDUPTHER

## 2019-07-11 RX ORDER — OMEPRAZOLE 40 MG/1
40 CAPSULE, DELAYED RELEASE ORAL DAILY
Qty: 30 CAPSULE | Refills: 1 | Status: SHIPPED | OUTPATIENT
Start: 2019-07-11 | End: 2019-11-08 | Stop reason: SDUPTHER

## 2019-07-11 RX ORDER — METOPROLOL TARTRATE 100 MG/1
100 TABLET ORAL DAILY
Qty: 30 TABLET | Refills: 5 | Status: SHIPPED | OUTPATIENT
Start: 2019-07-11 | End: 2020-07-07 | Stop reason: SDUPTHER

## 2019-07-11 RX ORDER — ATORVASTATIN CALCIUM 40 MG/1
40 TABLET, FILM COATED ORAL DAILY
Qty: 30 TABLET | Refills: 5 | Status: SHIPPED | OUTPATIENT
Start: 2019-07-11 | End: 2020-04-13

## 2019-07-11 RX ORDER — AMMONIUM LACTATE 12 G/100G
CREAM TOPICAL AS NEEDED
Qty: 385 G | Refills: 0 | Status: SHIPPED | OUTPATIENT
Start: 2019-07-11 | End: 2021-03-03 | Stop reason: ALTCHOICE

## 2019-07-11 RX ORDER — LISINOPRIL 40 MG/1
40 TABLET ORAL DAILY
Qty: 90 TABLET | Refills: 1 | Status: SHIPPED | OUTPATIENT
Start: 2019-07-11 | End: 2020-04-13

## 2019-07-11 RX ORDER — ERGOCALCIFEROL 1.25 MG/1
50000 CAPSULE ORAL WEEKLY
Qty: 4 CAPSULE | Refills: 5 | Status: SHIPPED | OUTPATIENT
Start: 2019-07-11 | End: 2020-07-07 | Stop reason: SDUPTHER

## 2019-07-11 RX ORDER — HYDROCORTISONE ACETATE 25 MG/1
25 SUPPOSITORY RECTAL 2 TIMES DAILY PRN
Qty: 24 SUPPOSITORY | Refills: 1 | Status: SHIPPED | OUTPATIENT
Start: 2019-07-11 | End: 2020-07-07 | Stop reason: ALTCHOICE

## 2019-07-11 NOTE — PROGRESS NOTES
Assessment/Plan:    Type 2 diabetes mellitus without complication, with long-term current use of insulin (Formerly [facility])  Lab Results   Component Value Date    HGBA1C 9 1 (A) 07/11/2019       No results for input(s): POCGLU in the last 72 hours  Blood Sugar Average: Last 72 hrs:   Discussed with patient eating healthy meals to help control blood glucose and overall health  Also recommended weight loss to help the body use insulin better  Doing physical activity can also make the body more sensitive to insulin and help reach and maintain a healthy weight  It will also lower the levels of fat in the blood as well as reduce the risk of heart disease  Explained the importance of checking blood glucose levels and keeping a log to bring in with each visit  Keeping HbA1c in the goal range can help lower the chance of complications  A HbA1c < 7% is generally recommended  Explained the importance of the prevention of retinopathy, nephropathy, neuropathy and diabetic foot ulcers  Encouraged coming every 3 months for diabetes checks and having  yearly eye exams as well as practicing good foot care  -To continue on Lantus insulin 40 units at bedtime  We are d/c metformin 1000mg BID and starting Invokamet BID  To follow up in 1 month  Mixed hyperlipidemia  Recommend  lifestyle modifications like eating a heart healthy diet, regular exercises, and maintaining a  desirable body weight  Advise on reduced added sugars, increase omega-3, eliminate trans fat and saturated fat  Also recommend to avoid fried and fatty foods and limit sodium intake      -Starting patient on atorvastatin daily  Ordering CMP to check liver enzymes and lipid panel  Hypertension  -With opitmal control  Recommended to follow a low sodium diet and exercise  To continue on current medication regimen   For follow up every 6 months     -Ordering CMP    Vitamin D deficiency  Explained to patient that the mainstay of treatment is the provision of vitamin D  The goal is to reach and maintain serum between 30-100nanograms/ml  Today we are prescribing patient Vitamin D replacement x 8 weeks  I also recommended adequate sun exposure, usually exposure to arms and legs for about 5- 30 minutes between 10am and 3pm twice a week is sufficient to stimulate cuteneous vitamin D  Pt advised to have repeat vitamin D levels in 6 mo  BMI Counseling: Body mass index is 36 48 kg/m²  Discussed the patient's BMI with him  The BMI is above average  BMI counseling and education was provided to the patient  Nutrition recommendations include reducing portion sizes, 3-5 servings of fruits/vegetables daily, reducing fast food intake, increasing intake of lean protein and reducing intake of saturated fat and trans fat  Exercise recommendations include moderate aerobic physical activity for 150 minutes/week  Diagnoses and all orders for this visit:    Type 2 diabetes mellitus without complication, with long-term current use of insulin (HCC)  -     Microalbumin / creatinine urine ratio  -     POCT hemoglobin A1c  -     Ambulatory referral to Podiatry; Future  -     Comprehensive metabolic panel; Future  -     Canagliflozin-metFORMIN HCl (INVOKAMET)  MG TABS; Take 1 tablet by mouth 2 (two) times a day  -     insulin glargine (LANTUS SOLOSTAR) 100 units/mL injection pen; Inject 40 Units under the skin daily at bedtime  -     ammonium lactate (LAC-HYDRIN) 12 % cream; Apply topically as needed for dry skin    Mixed hyperlipidemia  -     atorvastatin (LIPITOR) 40 mg tablet; Take 1 tablet (40 mg total) by mouth daily    Hypertension, unspecified type  -     lisinopril (ZESTRIL) 40 mg tablet; Take 1 tablet (40 mg total) by mouth daily  -     metoprolol tartrate (LOPRESSOR) 100 mg tablet; Take 1 tablet (100 mg total) by mouth daily    Vitamin D deficiency  -     ergocalciferol (VITAMIN D2) 50,000 units;  Take 1 capsule (50,000 Units total) by mouth once a week    Obesity (BMI 35 0-39 9 without comorbidity)  -     TSH, 3rd generation with Free T4 reflex; Future    Screening for cholesterol level  -     Lipid panel; Future    Type 2 diabetes mellitus without complication, with long-term current use of insulin (Former[facility])  Comments:      Orders:  -     Microalbumin / creatinine urine ratio  -     POCT hemoglobin A1c  -     Ambulatory referral to Podiatry; Future  -     Comprehensive metabolic panel; Future  -     Canagliflozin-metFORMIN HCl (INVOKAMET)  MG TABS; Take 1 tablet by mouth 2 (two) times a day  -     insulin glargine (LANTUS SOLOSTAR) 100 units/mL injection pen; Inject 40 Units under the skin daily at bedtime  -     ammonium lactate (LAC-HYDRIN) 12 % cream; Apply topically as needed for dry skin    Gastroesophageal reflux disease without esophagitis  -     omeprazole (PriLOSEC) 40 MG capsule; Take 1 capsule (40 mg total) by mouth daily    Depression, unspecified depression type  -     sertraline (ZOLOFT) 100 mg tablet; Take 1 tablet (100 mg total) by mouth daily    Hemorrhoids, unspecified hemorrhoid type  -     hydrocortisone (ANUSOL-HC) 25 mg suppository; Insert 1 suppository (25 mg total) into the rectum 2 (two) times a day as needed for hemorrhoids    Erectile dysfunction, unspecified erectile dysfunction type  -     Testosterone; Future          Subjective:      Patient ID: Laron Ceballos is a 54 y o  male  Establish care and diabetes  54year old male patient here to establish care  PMHx: type 2 diabetes, hypertension, esophageal ulcer  Diabetes   He presents for his follow-up diabetic visit  He has type 2 diabetes mellitus  No MedicAlert identification noted  His disease course has been fluctuating  There are no hypoglycemic associated symptoms  Pertinent negatives for hypoglycemia include no headaches, hunger, nervousness/anxiousness, seizures, sweats or tremors  Associated symptoms include foot paresthesias, polydipsia and polyuria   Pertinent negatives for diabetes include no chest pain, no fatigue and no weight loss  There are no hypoglycemic complications  Symptoms are worsening  Diabetic complications include peripheral neuropathy  Pertinent negatives for diabetic complications include no autonomic neuropathy, CVA, heart disease, impotence, PVD or retinopathy  Risk factors for coronary artery disease include diabetes mellitus, hypertension, male sex, obesity, sedentary lifestyle, dyslipidemia and family history  Current diabetic treatment includes oral agent (monotherapy) and insulin injections  He is compliant with treatment all of the time  His weight is stable  He is following a generally unhealthy diet  When asked about meal planning, he reported none  He has not had a previous visit with a dietitian  He never participates in exercise  An ACE inhibitor/angiotensin II receptor blocker is being taken  He does not see a podiatrist Eye exam is current  Hypertension   This is a chronic problem  The current episode started today  The problem has been waxing and waning since onset  The problem is uncontrolled  Pertinent negatives include no chest pain, headaches, palpitations, peripheral edema, shortness of breath or sweats  There are no associated agents to hypertension  Risk factors for coronary artery disease include obesity, male gender, sedentary lifestyle, family history and diabetes mellitus  Past treatments include calcium channel blockers  The current treatment provides moderate improvement  Compliance problems include diet and exercise  There is no history of CAD/MI, CVA, PVD or retinopathy  There is no history of chronic renal disease, a hypertension causing med, sleep apnea or a thyroid problem  The following portions of the patient's history were reviewed and updated as appropriate: allergies, current medications, past family history, past medical history, past social history, past surgical history and problem list     Review of Systems   Constitutional: Negative  Negative for fatigue and weight loss  HENT: Negative  Negative for congestion  Eyes: Negative  Respiratory: Negative  Negative for chest tightness, shortness of breath and wheezing  Cardiovascular: Negative  Negative for chest pain, palpitations and leg swelling  Gastrointestinal: Negative  Negative for abdominal distention  Endocrine: Positive for polydipsia and polyuria  Genitourinary: Negative  Negative for impotence  Musculoskeletal: Negative  Negative for arthralgias and back pain  Skin: Negative  Allergic/Immunologic: Negative  Neurological: Negative  Negative for tremors, seizures and headaches  Hematological: Negative  Psychiatric/Behavioral: Negative  The patient is not nervous/anxious  Objective:      /90 (BP Location: Left arm, Patient Position: Sitting, Cuff Size: Large)   Pulse 64   Temp (!) 97 4 °F (36 3 °C) (Temporal)   Resp 18   Ht 5' 9" (1 753 m)   Wt 112 kg (247 lb)   SpO2 98%   BMI 36 48 kg/m²          Physical Exam   Constitutional: He is oriented to person, place, and time  He appears well-developed and well-nourished  No distress  HENT:   Head: Normocephalic and atraumatic  Right Ear: External ear normal    Left Ear: External ear normal    Eyes: Pupils are equal, round, and reactive to light  Conjunctivae and EOM are normal    Neck: Normal range of motion  Neck supple  Cardiovascular: Normal rate and regular rhythm  Exam reveals no gallop and no friction rub  Pulses are no weak pulses  No murmur heard  Pulses:       Dorsalis pedis pulses are 2+ on the right side, and 2+ on the left side  Posterior tibial pulses are 2+ on the right side, and 2+ on the left side  Pulmonary/Chest: Effort normal and breath sounds normal  No respiratory distress  He has no wheezes  Abdominal: Soft  Bowel sounds are normal    Musculoskeletal: Normal range of motion  Feet:   Right Foot:   Skin Integrity: Positive for dry skin   Negative for ulcer, skin breakdown, erythema, warmth or callus  Left Foot:   Skin Integrity: Positive for dry skin  Negative for ulcer, skin breakdown, erythema, warmth or callus  Lymphadenopathy:     He has no cervical adenopathy  Neurological: He is alert and oriented to person, place, and time  He displays normal reflexes  No cranial nerve deficit  Coordination normal    Skin: Skin is warm and dry  Capillary refill takes less than 2 seconds  He is not diaphoretic  Psychiatric: He has a normal mood and affect  His behavior is normal  Judgment and thought content normal    Nursing note and vitals reviewed  Patient's shoes and socks removed  Right Foot/Ankle   Right Foot Inspection  Skin Exam: skin normal, skin intact and dry skin no warmth, no callus, no erythema, no maceration, no abnormal color, no pre-ulcer, no ulcer and no callus                          Toe Exam: ROM and strength within normal limitsno swelling, no tenderness, erythema and  no right toe deformity  Sensory   Vibration: intact  Proprioception: intact   Monofilament testing: absent  Vascular  Capillary refills: < 3 seconds  The right DP pulse is 2+  The right PT pulse is 2+  Left Foot/Ankle  Left Foot Inspection  Skin Exam: skin normal, skin intact and dry skinno warmth, no erythema, no maceration, normal color, no pre-ulcer, no ulcer and no callus                         Toe Exam: ROM and strength within normal limitsno swelling, no tenderness, no erythema and no left toe deformity                   Sensory   Vibration: intact  Proprioception: intact  Monofilament: absent  Vascular  Capillary refills: < 3 seconds  The left DP pulse is 2+  The left PT pulse is 2+  Assign Risk Category:  No deformity present; No loss of protective sensation;  No weak pulses       Risk: 0

## 2019-07-13 PROBLEM — E78.2 MIXED HYPERLIPIDEMIA: Status: ACTIVE | Noted: 2019-07-13

## 2019-07-14 NOTE — ASSESSMENT & PLAN NOTE
Explained to patient that the mainstay of treatment is the provision of vitamin D  The goal is to reach and maintain serum between 30-100nanograms/ml  Today we are prescribing patient Vitamin D replacement x 8 weeks  I also recommended adequate sun exposure, usually exposure to arms and legs for about 5- 30 minutes between 10am and 3pm twice a week is sufficient to stimulate cuteneous vitamin D  Pt advised to have repeat vitamin D levels in 6 mo

## 2019-07-14 NOTE — ASSESSMENT & PLAN NOTE
Lab Results   Component Value Date    HGBA1C 9 1 (A) 07/11/2019       No results for input(s): POCGLU in the last 72 hours  Blood Sugar Average: Last 72 hrs:   Discussed with patient eating healthy meals to help control blood glucose and overall health  Also recommended weight loss to help the body use insulin better  Doing physical activity can also make the body more sensitive to insulin and help reach and maintain a healthy weight  It will also lower the levels of fat in the blood as well as reduce the risk of heart disease  Explained the importance of checking blood glucose levels and keeping a log to bring in with each visit  Keeping HbA1c in the goal range can help lower the chance of complications  A HbA1c < 7% is generally recommended  Explained the importance of the prevention of retinopathy, nephropathy, neuropathy and diabetic foot ulcers  Encouraged coming every 3 months for diabetes checks and having  yearly eye exams as well as practicing good foot care  -To continue on Lantus insulin 40 units at bedtime  We are d/c metformin 1000mg BID and starting Invokamet BID  To follow up in 1 month

## 2019-07-14 NOTE — ASSESSMENT & PLAN NOTE
-With opitmal control  Recommended to follow a low sodium diet and exercise  To continue on current medication regimen   For follow up every 6 months     -Ordering CMP

## 2019-07-14 NOTE — ASSESSMENT & PLAN NOTE
Recommend  lifestyle modifications like eating a heart healthy diet, regular exercises, and maintaining a  desirable body weight  Advise on reduced added sugars, increase omega-3, eliminate trans fat and saturated fat  Also recommend to avoid fried and fatty foods and limit sodium intake      -Starting patient on atorvastatin daily  Ordering CMP to check liver enzymes and lipid panel

## 2019-08-04 ENCOUNTER — APPOINTMENT (OUTPATIENT)
Dept: LAB | Facility: HOSPITAL | Age: 49
End: 2019-08-04
Payer: COMMERCIAL

## 2019-08-04 DIAGNOSIS — Z79.4 TYPE 2 DIABETES MELLITUS WITHOUT COMPLICATION, WITH LONG-TERM CURRENT USE OF INSULIN (HCC): ICD-10-CM

## 2019-08-04 DIAGNOSIS — E11.9 TYPE 2 DIABETES MELLITUS WITHOUT COMPLICATION, WITH LONG-TERM CURRENT USE OF INSULIN (HCC): ICD-10-CM

## 2019-08-04 DIAGNOSIS — Z13.220 SCREENING FOR CHOLESTEROL LEVEL: ICD-10-CM

## 2019-08-04 DIAGNOSIS — N52.9 ERECTILE DYSFUNCTION, UNSPECIFIED ERECTILE DYSFUNCTION TYPE: ICD-10-CM

## 2019-08-04 DIAGNOSIS — E66.9 OBESITY (BMI 35.0-39.9 WITHOUT COMORBIDITY): ICD-10-CM

## 2019-08-04 LAB
ALBUMIN SERPL BCP-MCNC: 3.9 G/DL (ref 3.5–5)
ALP SERPL-CCNC: 125 U/L (ref 46–116)
ALT SERPL W P-5'-P-CCNC: 26 U/L (ref 12–78)
ANION GAP SERPL CALCULATED.3IONS-SCNC: 7 MMOL/L (ref 4–13)
AST SERPL W P-5'-P-CCNC: 8 U/L (ref 5–45)
BILIRUB SERPL-MCNC: 0.78 MG/DL (ref 0.2–1)
BUN SERPL-MCNC: 10 MG/DL (ref 5–25)
CALCIUM SERPL-MCNC: 8.6 MG/DL (ref 8.3–10.1)
CHLORIDE SERPL-SCNC: 103 MMOL/L (ref 100–108)
CHOLEST SERPL-MCNC: 59 MG/DL (ref 50–200)
CO2 SERPL-SCNC: 26 MMOL/L (ref 21–32)
CREAT SERPL-MCNC: 0.78 MG/DL (ref 0.6–1.3)
GFR SERPL CREATININE-BSD FRML MDRD: 107 ML/MIN/1.73SQ M
GLUCOSE P FAST SERPL-MCNC: 311 MG/DL (ref 65–99)
HDLC SERPL-MCNC: 18 MG/DL (ref 40–60)
LDLC SERPL CALC-MCNC: 3 MG/DL (ref 0–100)
NONHDLC SERPL-MCNC: 41 MG/DL
POTASSIUM SERPL-SCNC: 3.4 MMOL/L (ref 3.5–5.3)
PROT SERPL-MCNC: 7.5 G/DL (ref 6.4–8.2)
SODIUM SERPL-SCNC: 136 MMOL/L (ref 136–145)
TESTOST SERPL-MCNC: 160 NG/DL (ref 113–1065)
TRIGL SERPL-MCNC: 190 MG/DL
TSH SERPL DL<=0.05 MIU/L-ACNC: 2.03 UIU/ML (ref 0.36–3.74)

## 2019-08-04 PROCEDURE — 84443 ASSAY THYROID STIM HORMONE: CPT

## 2019-08-04 PROCEDURE — 80053 COMPREHEN METABOLIC PANEL: CPT

## 2019-08-04 PROCEDURE — 36415 COLL VENOUS BLD VENIPUNCTURE: CPT

## 2019-08-04 PROCEDURE — 80061 LIPID PANEL: CPT

## 2019-08-04 PROCEDURE — 84403 ASSAY OF TOTAL TESTOSTERONE: CPT

## 2019-08-05 ENCOUNTER — OFFICE VISIT (OUTPATIENT)
Dept: FAMILY MEDICINE CLINIC | Facility: CLINIC | Age: 49
End: 2019-08-05
Payer: COMMERCIAL

## 2019-08-05 VITALS
TEMPERATURE: 97 F | OXYGEN SATURATION: 98 % | DIASTOLIC BLOOD PRESSURE: 80 MMHG | WEIGHT: 244.6 LBS | HEART RATE: 75 BPM | BODY MASS INDEX: 36.23 KG/M2 | HEIGHT: 69 IN | SYSTOLIC BLOOD PRESSURE: 120 MMHG

## 2019-08-05 DIAGNOSIS — H65.191 OTHER NON-RECURRENT ACUTE NONSUPPURATIVE OTITIS MEDIA OF RIGHT EAR: Primary | ICD-10-CM

## 2019-08-05 DIAGNOSIS — E11.65 TYPE 2 DIABETES MELLITUS WITH HYPERGLYCEMIA, WITHOUT LONG-TERM CURRENT USE OF INSULIN (HCC): ICD-10-CM

## 2019-08-05 DIAGNOSIS — E87.6 HYPOKALEMIA: ICD-10-CM

## 2019-08-05 PROCEDURE — 3008F BODY MASS INDEX DOCD: CPT | Performed by: NURSE PRACTITIONER

## 2019-08-05 PROCEDURE — 99213 OFFICE O/P EST LOW 20 MIN: CPT | Performed by: NURSE PRACTITIONER

## 2019-08-05 RX ORDER — AMOXICILLIN 500 MG/1
500 CAPSULE ORAL EVERY 12 HOURS SCHEDULED
Qty: 20 CAPSULE | Refills: 0 | Status: SHIPPED | OUTPATIENT
Start: 2019-08-05 | End: 2019-08-15

## 2019-08-05 NOTE — LETTER
August 5, 2019     Patient: Unique Webb   YOB: 1970   Date of Visit: 8/5/2019       To Whom it May Concern:    Camryn Valles is under my professional care  He was seen in my office on 8/5/2019  He may return to work on 8/6/2019  If you have any questions or concerns, please don't hesitate to call           Sincerely,          PEDRO Rodriguez        CC: No Recipients

## 2019-08-05 NOTE — PROGRESS NOTES
Assessment/Plan:    Acute otitis media  Starting patient on Amoxicillin 500mgx BID x 10 days  I explained to patient that it is likely due to viral URI  Patient will practice conservative measures  To take plain tylenol for any pain  Follow up if s/s do not improve  Diagnoses and all orders for this visit:    Other non-recurrent acute nonsuppurative otitis media of right ear  -     amoxicillin (AMOXIL) 500 mg capsule; Take 1 capsule (500 mg total) by mouth every 12 (twelve) hours for 10 days    Hypokalemia  -     Comprehensive metabolic panel; Future    Type 2 diabetes mellitus with hyperglycemia, without long-term current use of insulin (HCC)  -     Canagliflozin-metFORMIN HCl  MG TABS; Take 1 tablet by mouth 2 (two) times a day    Other orders  -     Discontinue: metFORMIN (GLUCOPHAGE) 1000 MG tablet  -     Discontinue: Canagliflozin-metFORMIN HCl  MG TABS; Take 1 tablet by mouth 2 (two) times a day          Subjective:      Patient ID: Monico Hudson is a 54 y o  male  Earache    There is pain in the right ear  This is a new problem  The current episode started in the past 7 days (3 days)  The problem occurs constantly  The problem has been gradually worsening  There has been no fever  The pain is at a severity of 8/10  The pain is severe  Associated symptoms include ear discharge, headaches and hearing loss  Pertinent negatives include no coughing, diarrhea, rash, sore throat or vomiting  Associated symptoms comments: nausea  He has tried nothing for the symptoms  The treatment provided no relief  His past medical history is significant for a chronic ear infection and a tympanostomy tube  There is no history of hearing loss         The following portions of the patient's history were reviewed and updated as appropriate: allergies, current medications, past family history, past medical history, past social history, past surgical history and problem list     Review of Systems   Constitutional: Positive for appetite change and unexpected weight change  HENT: Positive for ear discharge, ear pain and hearing loss  Negative for sore throat  Eyes: Negative  Respiratory: Negative  Negative for cough, chest tightness and wheezing  Cardiovascular: Negative  Negative for chest pain and palpitations  Gastrointestinal: Negative  Negative for diarrhea and vomiting  Endocrine: Negative  Genitourinary: Negative  Musculoskeletal: Negative  Negative for arthralgias and gait problem  Skin: Negative  Negative for color change and rash  Allergic/Immunologic: Negative  Negative for immunocompromised state  Neurological: Positive for headaches  Hematological: Negative  Does not bruise/bleed easily  Psychiatric/Behavioral: Negative  Objective:      /80 (BP Location: Left arm, Patient Position: Sitting, Cuff Size: Adult)   Pulse 75   Temp (!) 97 °F (36 1 °C) (Temporal)   Ht 5' 9" (1 753 m)   Wt 111 kg (244 lb 9 6 oz)   SpO2 98%   BMI 36 12 kg/m²          Physical Exam   Constitutional: He is oriented to person, place, and time  He appears well-developed and well-nourished  No distress  HENT:   Head: Normocephalic and atraumatic  Right Ear: External ear normal  There is tenderness  No drainage  Tympanic membrane is injected and erythematous  Left Ear: External ear normal  No drainage or tenderness  Tympanic membrane is not injected  No middle ear effusion  Nose: Nose normal    Mouth/Throat: Uvula is midline, oropharynx is clear and moist and mucous membranes are normal    Eyes: Pupils are equal, round, and reactive to light  Conjunctivae and EOM are normal    Neck: Normal range of motion  Neck supple  Cardiovascular: Normal rate, regular rhythm and normal heart sounds  Exam reveals no gallop and no friction rub  No murmur heard  Pulmonary/Chest: Effort normal and breath sounds normal  No respiratory distress  He has no wheezes  Abdominal: Soft   Bowel sounds are normal    Musculoskeletal: Normal range of motion  Lymphadenopathy:     He has no cervical adenopathy  Neurological: He is alert and oriented to person, place, and time  Skin: Skin is warm and dry  Capillary refill takes less than 2 seconds  He is not diaphoretic  Psychiatric: He has a normal mood and affect  Thought content normal    Nursing note and vitals reviewed

## 2019-08-06 NOTE — ASSESSMENT & PLAN NOTE
Starting patient on Amoxicillin 500mgx BID x 10 days  I explained to patient that it is likely due to viral URI  Patient will practice conservative measures  To take plain tylenol for any pain  Follow up if s/s do not improve

## 2019-08-07 ENCOUNTER — TELEPHONE (OUTPATIENT)
Dept: FAMILY MEDICINE CLINIC | Facility: CLINIC | Age: 49
End: 2019-08-07

## 2019-08-08 NOTE — TELEPHONE ENCOUNTER
Patient called and I left message for patient regarding his medication being approved and that he may go and pick it up

## 2019-11-08 DIAGNOSIS — K21.9 GASTROESOPHAGEAL REFLUX DISEASE WITHOUT ESOPHAGITIS: ICD-10-CM

## 2019-11-08 RX ORDER — OMEPRAZOLE 40 MG/1
CAPSULE, DELAYED RELEASE ORAL
Qty: 30 CAPSULE | Refills: 1 | Status: SHIPPED | OUTPATIENT
Start: 2019-11-08 | End: 2020-01-10 | Stop reason: SDUPTHER

## 2020-01-10 ENCOUNTER — OFFICE VISIT (OUTPATIENT)
Dept: FAMILY MEDICINE CLINIC | Facility: CLINIC | Age: 50
End: 2020-01-10
Payer: COMMERCIAL

## 2020-01-10 VITALS
OXYGEN SATURATION: 98 % | WEIGHT: 236.4 LBS | SYSTOLIC BLOOD PRESSURE: 132 MMHG | DIASTOLIC BLOOD PRESSURE: 90 MMHG | BODY MASS INDEX: 35.01 KG/M2 | HEART RATE: 63 BPM | HEIGHT: 69 IN

## 2020-01-10 DIAGNOSIS — Z11.4 ENCOUNTER FOR SCREENING FOR HIV: ICD-10-CM

## 2020-01-10 DIAGNOSIS — I10 ESSENTIAL HYPERTENSION: ICD-10-CM

## 2020-01-10 DIAGNOSIS — K21.9 GASTROESOPHAGEAL REFLUX DISEASE WITHOUT ESOPHAGITIS: ICD-10-CM

## 2020-01-10 DIAGNOSIS — F32.A DEPRESSION, UNSPECIFIED DEPRESSION TYPE: ICD-10-CM

## 2020-01-10 DIAGNOSIS — E11.65 TYPE 2 DIABETES MELLITUS WITH HYPERGLYCEMIA, WITHOUT LONG-TERM CURRENT USE OF INSULIN (HCC): Primary | ICD-10-CM

## 2020-01-10 PROCEDURE — 1036F TOBACCO NON-USER: CPT | Performed by: NURSE PRACTITIONER

## 2020-01-10 PROCEDURE — 99214 OFFICE O/P EST MOD 30 MIN: CPT | Performed by: NURSE PRACTITIONER

## 2020-01-10 PROCEDURE — 3008F BODY MASS INDEX DOCD: CPT | Performed by: NURSE PRACTITIONER

## 2020-01-10 RX ORDER — SERTRALINE HYDROCHLORIDE 100 MG/1
100 TABLET, FILM COATED ORAL DAILY
Qty: 30 TABLET | Refills: 5 | Status: SHIPPED | OUTPATIENT
Start: 2020-01-10 | End: 2020-07-07 | Stop reason: SDUPTHER

## 2020-01-10 RX ORDER — OMEPRAZOLE 40 MG/1
40 CAPSULE, DELAYED RELEASE ORAL DAILY
Qty: 90 CAPSULE | Refills: 1 | Status: SHIPPED | OUTPATIENT
Start: 2020-01-10 | End: 2021-03-03 | Stop reason: ALTCHOICE

## 2020-01-10 NOTE — PROGRESS NOTES
Assessment/Plan:    Type 2 diabetes mellitus with hyperglycemia, without long-term current use of insulin ([facility])    Lab Results   Component Value Date    HGBA1C 9 1 (A) 07/11/2019       -Repeating A1c on patient as uncontrolled  To continue on medications as ordered and insulin  To follow diet as plan and weight loss we discussed  Offered referral to nutritionist but patient refused  To follow up in 3 months  Essential hypertension  -Controlled at this time  To continue on medications as ordered  No changes made  Follow low sodium diet with exercise  Follow up every 6 months  Depression  -With good control on Zoloft  To continue on current regimen  No changes made  Diagnoses and all orders for this visit:    Type 2 diabetes mellitus with hyperglycemia, without long-term current use of insulin ([facility] )  -     Ambulatory referral to Ophthalmology; Future  -     Comprehensive metabolic panel; Future  -     Hemoglobin A1C; Future  -     metFORMIN (GLUCOPHAGE) 1000 MG tablet; Take 1 tablet (1,000 mg total) by mouth 2 (two) times a day with meals  -     Ambulatory referral to Podiatry; Future    Essential hypertension    Depression, unspecified depression type  -     sertraline (ZOLOFT) 100 mg tablet; Take 1 tablet (100 mg total) by mouth daily    Encounter for screening for HIV  -     HIV 1/2 AG-AB combo; Future    Gastroesophageal reflux disease without esophagitis  -     omeprazole (PriLOSEC) 40 MG capsule; Take 1 capsule (40 mg total) by mouth daily    Other orders  -     Discontinue: metFORMIN (GLUCOPHAGE) 1000 MG tablet  -     Cancel: POCT hemoglobin A1c          Subjective:      Patient ID: Hu Hoffman is a 54 y o  male  Diabetes   He presents for his follow-up diabetic visit  He has type 2 diabetes mellitus  No MedicAlert identification noted  His disease course has been improving  Hypoglycemia symptoms include dizziness (1 episode ) and sweats   Pertinent negatives for hypoglycemia include no headaches or hunger  Pertinent negatives for diabetes include no blurred vision, no chest pain, no fatigue, no foot paresthesias, no polydipsia and no polyuria  Hypoglycemia complications include hospitalization (recently due to not taking his medication)  Symptoms are stable  Pertinent negatives for diabetic complications include no autonomic neuropathy, CVA, heart disease, impotence, peripheral neuropathy or retinopathy  Risk factors for coronary artery disease include diabetes mellitus, family history, male sex, hypertension, sedentary lifestyle and obesity  Current diabetic treatment includes oral agent (dual therapy) and insulin injections  He is compliant with treatment most of the time  His weight is stable  He is following a generally unhealthy diet  He has not had a previous visit with a dietitian  He never participates in exercise  An ACE inhibitor/angiotensin II receptor blocker is being taken  He sees a podiatrist Eye exam is current  Hypertension   This is a recurrent problem  The current episode started today  The problem has been gradually improving since onset  The problem is controlled  Associated symptoms include sweats  Pertinent negatives include no blurred vision, chest pain, headaches, palpitations or shortness of breath  There are no associated agents to hypertension  Risk factors for coronary artery disease include male gender, obesity, diabetes mellitus, dyslipidemia and family history  Past treatments include ACE inhibitors  The current treatment provides significant improvement  Compliance problems include exercise and diet  There is no history of angina, CAD/MI, CVA, left ventricular hypertrophy or retinopathy  There is no history of chronic renal disease, sleep apnea or a thyroid problem         The following portions of the patient's history were reviewed and updated as appropriate: allergies, current medications, past family history, past medical history, past social history, past surgical history and problem list     Review of Systems   Constitutional: Positive for unexpected weight change  Negative for appetite change and fatigue  HENT: Negative  Negative for congestion  Eyes: Negative  Negative for blurred vision  Respiratory: Negative  Negative for cough, chest tightness, shortness of breath and wheezing  Cardiovascular: Negative  Negative for chest pain and palpitations  Gastrointestinal: Negative  Negative for abdominal distention and blood in stool  Endocrine: Negative  Negative for polydipsia and polyuria  Genitourinary: Negative  Negative for impotence  Musculoskeletal: Negative  Negative for arthralgias  Skin: Negative  Allergic/Immunologic: Negative  Neurological: Positive for dizziness (1 episode )  Negative for headaches  Hematological: Negative  Psychiatric/Behavioral: Negative  Objective:      /90 (BP Location: Left arm, Patient Position: Sitting, Cuff Size: Adult)   Pulse 63   Ht 5' 9" (1 753 m)   Wt 107 kg (236 lb 6 4 oz)   SpO2 98%   BMI 34 91 kg/m²          Physical Exam   Constitutional: He is oriented to person, place, and time  He appears well-developed and well-nourished  No distress  HENT:   Head: Normocephalic and atraumatic  Right Ear: External ear normal    Left Ear: External ear normal    Eyes: Pupils are equal, round, and reactive to light  EOM are normal    Neck: Normal range of motion  Neck supple  Cardiovascular: Normal rate, regular rhythm, normal heart sounds and intact distal pulses  Exam reveals no gallop and no friction rub  No murmur heard  Pulmonary/Chest: Effort normal and breath sounds normal  No respiratory distress  He has no wheezes  Abdominal: Soft  Bowel sounds are normal    Musculoskeletal: Normal range of motion  Lymphadenopathy:     He has no cervical adenopathy  Neurological: He is alert and oriented to person, place, and time  Skin: Skin is warm   Capillary refill takes less than 2 seconds  He is not diaphoretic  Psychiatric: He has a normal mood and affect  Thought content normal    Nursing note and vitals reviewed

## 2020-01-13 PROBLEM — I10 ESSENTIAL HYPERTENSION: Status: ACTIVE | Noted: 2020-01-13

## 2020-01-13 NOTE — ASSESSMENT & PLAN NOTE
-Controlled at this time  To continue on medications as ordered  No changes made  Follow low sodium diet with exercise  Follow up every 6 months

## 2020-01-13 NOTE — ASSESSMENT & PLAN NOTE
Lab Results   Component Value Date    HGBA1C 9 1 (A) 07/11/2019       -Repeating A1c on patient as uncontrolled  To continue on medications as ordered and insulin  To follow diet as plan and weight loss we discussed  Offered referral to nutritionist but patient refused  To follow up in 3 months

## 2020-01-23 LAB
LEFT EYE DIABETIC RETINOPATHY: NORMAL
RIGHT EYE DIABETIC RETINOPATHY: NORMAL

## 2020-02-05 DIAGNOSIS — E11.9 TYPE 2 DIABETES MELLITUS WITHOUT COMPLICATION, WITH LONG-TERM CURRENT USE OF INSULIN (HCC): ICD-10-CM

## 2020-02-05 DIAGNOSIS — Z79.4 TYPE 2 DIABETES MELLITUS WITHOUT COMPLICATION, WITH LONG-TERM CURRENT USE OF INSULIN (HCC): ICD-10-CM

## 2020-02-07 RX ORDER — INSULIN GLARGINE 100 [IU]/ML
INJECTION, SOLUTION SUBCUTANEOUS
Qty: 12 ML | Refills: 0 | Status: SHIPPED | OUTPATIENT
Start: 2020-02-07 | End: 2020-04-13

## 2020-04-12 DIAGNOSIS — Z79.4 TYPE 2 DIABETES MELLITUS WITHOUT COMPLICATION, WITH LONG-TERM CURRENT USE OF INSULIN (HCC): ICD-10-CM

## 2020-04-12 DIAGNOSIS — E11.9 TYPE 2 DIABETES MELLITUS WITHOUT COMPLICATION, WITH LONG-TERM CURRENT USE OF INSULIN (HCC): ICD-10-CM

## 2020-04-12 DIAGNOSIS — E78.2 MIXED HYPERLIPIDEMIA: ICD-10-CM

## 2020-04-12 DIAGNOSIS — I10 HYPERTENSION, UNSPECIFIED TYPE: ICD-10-CM

## 2020-04-13 RX ORDER — ATORVASTATIN CALCIUM 40 MG/1
TABLET, FILM COATED ORAL
Qty: 90 TABLET | Refills: 1 | Status: SHIPPED | OUTPATIENT
Start: 2020-04-13 | End: 2020-07-07 | Stop reason: SDUPTHER

## 2020-04-13 RX ORDER — LISINOPRIL 40 MG/1
TABLET ORAL
Qty: 90 TABLET | Refills: 1 | Status: SHIPPED | OUTPATIENT
Start: 2020-04-13 | End: 2020-07-07 | Stop reason: SDUPTHER

## 2020-04-13 RX ORDER — INSULIN GLARGINE 100 [IU]/ML
INJECTION, SOLUTION SUBCUTANEOUS
Qty: 4 PEN | Refills: 0 | Status: SHIPPED | OUTPATIENT
Start: 2020-04-13 | End: 2020-06-29 | Stop reason: SDUPTHER

## 2020-05-04 DIAGNOSIS — E11.65 TYPE 2 DIABETES MELLITUS WITH HYPERGLYCEMIA, WITHOUT LONG-TERM CURRENT USE OF INSULIN (HCC): ICD-10-CM

## 2020-05-05 RX ORDER — PEN NEEDLE, DIABETIC 31 GX5/16"
NEEDLE, DISPOSABLE MISCELLANEOUS
Qty: 30 EACH | Refills: 3 | Status: SHIPPED | OUTPATIENT
Start: 2020-05-05 | End: 2020-07-07 | Stop reason: SDUPTHER

## 2020-05-22 DIAGNOSIS — E11.9 TYPE 2 DIABETES MELLITUS WITHOUT COMPLICATION, WITH LONG-TERM CURRENT USE OF INSULIN (HCC): ICD-10-CM

## 2020-05-22 DIAGNOSIS — Z79.4 TYPE 2 DIABETES MELLITUS WITHOUT COMPLICATION, WITH LONG-TERM CURRENT USE OF INSULIN (HCC): ICD-10-CM

## 2020-05-26 RX ORDER — INSULIN GLARGINE 100 [IU]/ML
INJECTION, SOLUTION SUBCUTANEOUS
Qty: 12 ML | OUTPATIENT
Start: 2020-05-26

## 2020-06-29 DIAGNOSIS — Z79.4 TYPE 2 DIABETES MELLITUS WITHOUT COMPLICATION, WITH LONG-TERM CURRENT USE OF INSULIN (HCC): ICD-10-CM

## 2020-06-29 DIAGNOSIS — E11.9 TYPE 2 DIABETES MELLITUS WITHOUT COMPLICATION, WITH LONG-TERM CURRENT USE OF INSULIN (HCC): ICD-10-CM

## 2020-07-07 ENCOUNTER — OFFICE VISIT (OUTPATIENT)
Dept: FAMILY MEDICINE CLINIC | Facility: CLINIC | Age: 50
End: 2020-07-07
Payer: COMMERCIAL

## 2020-07-07 VITALS
SYSTOLIC BLOOD PRESSURE: 132 MMHG | TEMPERATURE: 97.7 F | OXYGEN SATURATION: 98 % | HEIGHT: 69 IN | HEART RATE: 65 BPM | RESPIRATION RATE: 20 BRPM | WEIGHT: 228.8 LBS | DIASTOLIC BLOOD PRESSURE: 90 MMHG | BODY MASS INDEX: 33.89 KG/M2

## 2020-07-07 DIAGNOSIS — E11.9 TYPE 2 DIABETES MELLITUS WITHOUT COMPLICATION, WITH LONG-TERM CURRENT USE OF INSULIN (HCC): Primary | ICD-10-CM

## 2020-07-07 DIAGNOSIS — Z13.220 SCREENING FOR CHOLESTEROL LEVEL: ICD-10-CM

## 2020-07-07 DIAGNOSIS — E55.9 VITAMIN D DEFICIENCY: ICD-10-CM

## 2020-07-07 DIAGNOSIS — F32.A DEPRESSION, UNSPECIFIED DEPRESSION TYPE: ICD-10-CM

## 2020-07-07 DIAGNOSIS — E78.2 MIXED HYPERLIPIDEMIA: ICD-10-CM

## 2020-07-07 DIAGNOSIS — M25.50 MULTIPLE JOINT PAIN: ICD-10-CM

## 2020-07-07 DIAGNOSIS — I10 HYPERTENSION, UNSPECIFIED TYPE: ICD-10-CM

## 2020-07-07 DIAGNOSIS — Z79.4 TYPE 2 DIABETES MELLITUS WITHOUT COMPLICATION, WITH LONG-TERM CURRENT USE OF INSULIN (HCC): Primary | ICD-10-CM

## 2020-07-07 PROBLEM — E11.65 TYPE 2 DIABETES MELLITUS WITH HYPERGLYCEMIA, WITHOUT LONG-TERM CURRENT USE OF INSULIN (HCC): Status: RESOLVED | Noted: 2018-01-29 | Resolved: 2020-07-07

## 2020-07-07 PROCEDURE — 3066F NEPHROPATHY DOC TX: CPT | Performed by: NURSE PRACTITIONER

## 2020-07-07 PROCEDURE — 3008F BODY MASS INDEX DOCD: CPT | Performed by: NURSE PRACTITIONER

## 2020-07-07 PROCEDURE — 4010F ACE/ARB THERAPY RXD/TAKEN: CPT | Performed by: NURSE PRACTITIONER

## 2020-07-07 PROCEDURE — 99214 OFFICE O/P EST MOD 30 MIN: CPT | Performed by: NURSE PRACTITIONER

## 2020-07-07 PROCEDURE — 3080F DIAST BP >= 90 MM HG: CPT | Performed by: NURSE PRACTITIONER

## 2020-07-07 PROCEDURE — 3075F SYST BP GE 130 - 139MM HG: CPT | Performed by: NURSE PRACTITIONER

## 2020-07-07 RX ORDER — LISINOPRIL 40 MG/1
40 TABLET ORAL DAILY
Qty: 90 TABLET | Refills: 1 | Status: SHIPPED | OUTPATIENT
Start: 2020-07-07 | End: 2021-07-21

## 2020-07-07 RX ORDER — ERGOCALCIFEROL 1.25 MG/1
50000 CAPSULE ORAL WEEKLY
Qty: 4 CAPSULE | Refills: 5 | Status: SHIPPED | OUTPATIENT
Start: 2020-07-07 | End: 2021-07-21

## 2020-07-07 RX ORDER — LANCETS
EACH MISCELLANEOUS 2 TIMES DAILY
Qty: 60 EACH | Refills: 5 | Status: SHIPPED | OUTPATIENT
Start: 2020-07-07

## 2020-07-07 RX ORDER — METOPROLOL TARTRATE 100 MG/1
100 TABLET ORAL DAILY
Qty: 90 TABLET | Refills: 1 | Status: SHIPPED | OUTPATIENT
Start: 2020-07-07 | End: 2021-03-09

## 2020-07-07 RX ORDER — ATORVASTATIN CALCIUM 40 MG/1
40 TABLET, FILM COATED ORAL DAILY
Qty: 90 TABLET | Refills: 1 | Status: SHIPPED | OUTPATIENT
Start: 2020-07-07 | End: 2020-12-29 | Stop reason: SDUPTHER

## 2020-07-07 RX ORDER — IBUPROFEN 800 MG/1
800 TABLET ORAL 3 TIMES DAILY PRN
Qty: 30 TABLET | Refills: 0 | Status: SHIPPED | OUTPATIENT
Start: 2020-07-07 | End: 2020-12-15 | Stop reason: SDUPTHER

## 2020-07-07 RX ORDER — AMLODIPINE BESYLATE 10 MG/1
10 TABLET ORAL DAILY
Qty: 90 TABLET | Refills: 1 | Status: CANCELLED | OUTPATIENT
Start: 2020-07-07

## 2020-07-07 RX ORDER — SERTRALINE HYDROCHLORIDE 100 MG/1
100 TABLET, FILM COATED ORAL DAILY
Qty: 90 TABLET | Refills: 1 | Status: SHIPPED | OUTPATIENT
Start: 2020-07-07 | End: 2021-04-06

## 2020-07-07 RX ORDER — LANCETS
EACH MISCELLANEOUS 2 TIMES DAILY
Qty: 60 EACH | Refills: 5 | Status: SHIPPED | OUTPATIENT
Start: 2020-07-07 | End: 2020-07-07

## 2020-07-07 NOTE — ASSESSMENT & PLAN NOTE
Lab Results   Component Value Date    HGBA1C 9 1 (A) 07/11/2019     -Patient has not done his A1c due to pandemic but states he will do it this time  I am ordering A1c and CMP to be done on him  Advised on diet modifications and exercise  Pt to return in 3 months  To continue on insulin and metformin and will adjust accordingly

## 2020-07-07 NOTE — ASSESSMENT & PLAN NOTE
-Pt currently on metoprolol and lisinopril  To continue on this regimen  His BP was controlled today in office  Advised to take his BP x 2 weeks and call us with BP log  If elevated will consider adding a 3rd BP medication  Pt in agreement  To continue low sodium diet as indicated

## 2020-11-04 ENCOUNTER — LAB (OUTPATIENT)
Dept: LAB | Facility: HOSPITAL | Age: 50
End: 2020-11-04
Payer: COMMERCIAL

## 2020-11-04 ENCOUNTER — TELEMEDICINE (OUTPATIENT)
Dept: FAMILY MEDICINE CLINIC | Facility: CLINIC | Age: 50
End: 2020-11-04
Payer: COMMERCIAL

## 2020-11-04 DIAGNOSIS — R11.0 NAUSEA: ICD-10-CM

## 2020-11-04 DIAGNOSIS — E11.9 TYPE 2 DIABETES MELLITUS WITHOUT COMPLICATION, WITH LONG-TERM CURRENT USE OF INSULIN (HCC): ICD-10-CM

## 2020-11-04 DIAGNOSIS — Z13.220 SCREENING FOR CHOLESTEROL LEVEL: ICD-10-CM

## 2020-11-04 DIAGNOSIS — Z79.4 TYPE 2 DIABETES MELLITUS WITHOUT COMPLICATION, WITH LONG-TERM CURRENT USE OF INSULIN (HCC): ICD-10-CM

## 2020-11-04 DIAGNOSIS — Z11.4 ENCOUNTER FOR SCREENING FOR HIV: ICD-10-CM

## 2020-11-04 DIAGNOSIS — Z20.822 EXPOSURE TO COVID-19 VIRUS: Primary | ICD-10-CM

## 2020-11-04 DIAGNOSIS — R19.7 DIARRHEA, UNSPECIFIED TYPE: ICD-10-CM

## 2020-11-04 DIAGNOSIS — E11.65 TYPE 2 DIABETES MELLITUS WITH HYPERGLYCEMIA, WITHOUT LONG-TERM CURRENT USE OF INSULIN (HCC): ICD-10-CM

## 2020-11-04 LAB
ALBUMIN SERPL BCP-MCNC: 3.7 G/DL (ref 3.5–5)
ALP SERPL-CCNC: 101 U/L (ref 46–116)
ALT SERPL W P-5'-P-CCNC: 25 U/L (ref 12–78)
ANION GAP SERPL CALCULATED.3IONS-SCNC: 3 MMOL/L (ref 4–13)
AST SERPL W P-5'-P-CCNC: 8 U/L (ref 5–45)
BILIRUB SERPL-MCNC: 0.74 MG/DL (ref 0.2–1)
BUN SERPL-MCNC: 14 MG/DL (ref 5–25)
CALCIUM SERPL-MCNC: 8.6 MG/DL (ref 8.3–10.1)
CHLORIDE SERPL-SCNC: 110 MMOL/L (ref 100–108)
CHOLEST SERPL-MCNC: 76 MG/DL (ref 50–200)
CO2 SERPL-SCNC: 28 MMOL/L (ref 21–32)
CREAT SERPL-MCNC: 0.83 MG/DL (ref 0.6–1.3)
EST. AVERAGE GLUCOSE BLD GHB EST-MCNC: 140 MG/DL
GFR SERPL CREATININE-BSD FRML MDRD: 104 ML/MIN/1.73SQ M
GLUCOSE P FAST SERPL-MCNC: 167 MG/DL (ref 65–99)
HBA1C MFR BLD: 6.5 %
HDLC SERPL-MCNC: 26 MG/DL
LDLC SERPL CALC-MCNC: 15 MG/DL (ref 0–100)
NONHDLC SERPL-MCNC: 50 MG/DL
POTASSIUM SERPL-SCNC: 3.7 MMOL/L (ref 3.5–5.3)
PROT SERPL-MCNC: 6.9 G/DL (ref 6.4–8.2)
SODIUM SERPL-SCNC: 141 MMOL/L (ref 136–145)
TRIGL SERPL-MCNC: 177 MG/DL

## 2020-11-04 PROCEDURE — 80061 LIPID PANEL: CPT

## 2020-11-04 PROCEDURE — 87389 HIV-1 AG W/HIV-1&-2 AB AG IA: CPT

## 2020-11-04 PROCEDURE — 80053 COMPREHEN METABOLIC PANEL: CPT

## 2020-11-04 PROCEDURE — 83036 HEMOGLOBIN GLYCOSYLATED A1C: CPT

## 2020-11-04 PROCEDURE — 99213 OFFICE O/P EST LOW 20 MIN: CPT | Performed by: NURSE PRACTITIONER

## 2020-11-04 PROCEDURE — 36415 COLL VENOUS BLD VENIPUNCTURE: CPT

## 2020-11-04 PROCEDURE — 3044F HG A1C LEVEL LT 7.0%: CPT | Performed by: NURSE PRACTITIONER

## 2020-11-04 RX ORDER — ONDANSETRON 8 MG/1
8 TABLET, ORALLY DISINTEGRATING ORAL EVERY 8 HOURS PRN
Qty: 20 TABLET | Refills: 0 | Status: SHIPPED | OUTPATIENT
Start: 2020-11-04 | End: 2021-03-03 | Stop reason: ALTCHOICE

## 2020-11-04 RX ORDER — LOPERAMIDE HYDROCHLORIDE 2 MG/1
2 CAPSULE ORAL 4 TIMES DAILY PRN
Qty: 30 CAPSULE | Refills: 0 | Status: SHIPPED | OUTPATIENT
Start: 2020-11-04 | End: 2021-03-03 | Stop reason: ALTCHOICE

## 2020-11-05 LAB — HIV 1+2 AB+HIV1 P24 AG SERPL QL IA: NORMAL

## 2020-11-09 ENCOUNTER — TELEMEDICINE (OUTPATIENT)
Dept: FAMILY MEDICINE CLINIC | Facility: CLINIC | Age: 50
End: 2020-11-09
Payer: COMMERCIAL

## 2020-11-09 DIAGNOSIS — Z20.822 EXPOSURE TO COVID-19 VIRUS: Primary | ICD-10-CM

## 2020-11-09 PROCEDURE — 99213 OFFICE O/P EST LOW 20 MIN: CPT | Performed by: NURSE PRACTITIONER

## 2020-11-10 DIAGNOSIS — Z20.822 EXPOSURE TO COVID-19 VIRUS: ICD-10-CM

## 2020-11-10 PROCEDURE — U0003 INFECTIOUS AGENT DETECTION BY NUCLEIC ACID (DNA OR RNA); SEVERE ACUTE RESPIRATORY SYNDROME CORONAVIRUS 2 (SARS-COV-2) (CORONAVIRUS DISEASE [COVID-19]), AMPLIFIED PROBE TECHNIQUE, MAKING USE OF HIGH THROUGHPUT TECHNOLOGIES AS DESCRIBED BY CMS-2020-01-R: HCPCS | Performed by: NURSE PRACTITIONER

## 2020-11-11 LAB — SARS-COV-2 RNA SPEC QL NAA+PROBE: NOT DETECTED

## 2020-11-13 ENCOUNTER — TELEMEDICINE (OUTPATIENT)
Dept: FAMILY MEDICINE CLINIC | Facility: CLINIC | Age: 50
End: 2020-11-13
Payer: COMMERCIAL

## 2020-11-13 VITALS
BODY MASS INDEX: 32.88 KG/M2 | HEART RATE: 84 BPM | TEMPERATURE: 98.5 F | DIASTOLIC BLOOD PRESSURE: 99 MMHG | SYSTOLIC BLOOD PRESSURE: 168 MMHG | HEIGHT: 69 IN | WEIGHT: 222 LBS

## 2020-11-13 DIAGNOSIS — Z20.822 EXPOSURE TO COVID-19 VIRUS: ICD-10-CM

## 2020-11-13 DIAGNOSIS — R52 GENERALIZED BODY ACHES: Primary | ICD-10-CM

## 2020-11-13 DIAGNOSIS — I10 ESSENTIAL HYPERTENSION: ICD-10-CM

## 2020-11-13 PROCEDURE — 3077F SYST BP >= 140 MM HG: CPT | Performed by: NURSE PRACTITIONER

## 2020-11-13 PROCEDURE — 3080F DIAST BP >= 90 MM HG: CPT | Performed by: NURSE PRACTITIONER

## 2020-11-13 PROCEDURE — 3008F BODY MASS INDEX DOCD: CPT | Performed by: NURSE PRACTITIONER

## 2020-11-13 PROCEDURE — 99213 OFFICE O/P EST LOW 20 MIN: CPT | Performed by: NURSE PRACTITIONER

## 2020-11-13 RX ORDER — CYCLOBENZAPRINE HCL 10 MG
10 TABLET ORAL
Qty: 30 TABLET | Refills: 0 | Status: SHIPPED | OUTPATIENT
Start: 2020-11-13 | End: 2021-03-03 | Stop reason: ALTCHOICE

## 2020-11-16 ENCOUNTER — VBI (OUTPATIENT)
Dept: ADMINISTRATIVE | Facility: OTHER | Age: 50
End: 2020-11-16

## 2020-11-16 ENCOUNTER — TELEPHONE (OUTPATIENT)
Dept: FAMILY MEDICINE CLINIC | Facility: CLINIC | Age: 50
End: 2020-11-16

## 2020-11-18 ENCOUNTER — TELEPHONE (OUTPATIENT)
Dept: FAMILY MEDICINE CLINIC | Facility: CLINIC | Age: 50
End: 2020-11-18

## 2020-11-24 ENCOUNTER — VBI (OUTPATIENT)
Dept: ADMINISTRATIVE | Facility: OTHER | Age: 50
End: 2020-11-24

## 2020-12-12 DIAGNOSIS — E11.65 TYPE 2 DIABETES MELLITUS WITH HYPERGLYCEMIA, WITHOUT LONG-TERM CURRENT USE OF INSULIN (HCC): ICD-10-CM

## 2020-12-15 DIAGNOSIS — M25.50 MULTIPLE JOINT PAIN: ICD-10-CM

## 2020-12-18 RX ORDER — IBUPROFEN 800 MG/1
800 TABLET ORAL 3 TIMES DAILY PRN
Qty: 30 TABLET | Refills: 0 | Status: SHIPPED | OUTPATIENT
Start: 2020-12-18 | End: 2021-04-27

## 2020-12-21 DIAGNOSIS — E11.65 TYPE 2 DIABETES MELLITUS WITH HYPERGLYCEMIA, WITHOUT LONG-TERM CURRENT USE OF INSULIN (HCC): ICD-10-CM

## 2020-12-22 DIAGNOSIS — E11.9 TYPE 2 DIABETES MELLITUS WITHOUT COMPLICATION, WITH LONG-TERM CURRENT USE OF INSULIN (HCC): ICD-10-CM

## 2020-12-22 DIAGNOSIS — Z79.4 TYPE 2 DIABETES MELLITUS WITHOUT COMPLICATION, WITH LONG-TERM CURRENT USE OF INSULIN (HCC): ICD-10-CM

## 2020-12-22 RX ORDER — INSULIN GLARGINE 100 [IU]/ML
40 INJECTION, SOLUTION SUBCUTANEOUS
Qty: 12 PEN | Refills: 3 | Status: SHIPPED | OUTPATIENT
Start: 2020-12-22 | End: 2021-06-29 | Stop reason: SDUPTHER

## 2020-12-29 DIAGNOSIS — E78.2 MIXED HYPERLIPIDEMIA: ICD-10-CM

## 2020-12-29 DIAGNOSIS — R52 GENERALIZED BODY ACHES: ICD-10-CM

## 2020-12-29 RX ORDER — CYCLOBENZAPRINE HCL 10 MG
10 TABLET ORAL
Qty: 30 TABLET | Refills: 0 | OUTPATIENT
Start: 2020-12-29

## 2020-12-29 RX ORDER — ATORVASTATIN CALCIUM 40 MG/1
40 TABLET, FILM COATED ORAL DAILY
Qty: 90 TABLET | Refills: 1 | Status: SHIPPED | OUTPATIENT
Start: 2020-12-29 | End: 2021-06-29 | Stop reason: SDUPTHER

## 2021-02-03 ENCOUNTER — APPOINTMENT (OUTPATIENT)
Dept: URGENT CARE | Facility: CLINIC | Age: 51
End: 2021-02-03

## 2021-02-10 ENCOUNTER — APPOINTMENT (OUTPATIENT)
Dept: RADIOLOGY | Age: 51
End: 2021-02-10
Payer: OTHER MISCELLANEOUS

## 2021-02-10 ENCOUNTER — OCCMED (OUTPATIENT)
Dept: URGENT CARE | Age: 51
End: 2021-02-10
Payer: OTHER MISCELLANEOUS

## 2021-02-10 DIAGNOSIS — T14.90XA INJURY: ICD-10-CM

## 2021-02-10 DIAGNOSIS — T14.90XA INJURY: Primary | ICD-10-CM

## 2021-02-10 PROCEDURE — 73110 X-RAY EXAM OF WRIST: CPT

## 2021-02-10 PROCEDURE — 99213 OFFICE O/P EST LOW 20 MIN: CPT | Performed by: PREVENTIVE MEDICINE

## 2021-02-24 ENCOUNTER — APPOINTMENT (OUTPATIENT)
Dept: URGENT CARE | Age: 51
End: 2021-02-24
Payer: OTHER MISCELLANEOUS

## 2021-02-24 PROCEDURE — 99213 OFFICE O/P EST LOW 20 MIN: CPT | Performed by: PREVENTIVE MEDICINE

## 2021-03-03 ENCOUNTER — OFFICE VISIT (OUTPATIENT)
Dept: FAMILY MEDICINE CLINIC | Facility: CLINIC | Age: 51
End: 2021-03-03
Payer: COMMERCIAL

## 2021-03-03 VITALS
HEART RATE: 80 BPM | SYSTOLIC BLOOD PRESSURE: 160 MMHG | TEMPERATURE: 98 F | WEIGHT: 245 LBS | OXYGEN SATURATION: 98 % | RESPIRATION RATE: 16 BRPM | HEIGHT: 69 IN | DIASTOLIC BLOOD PRESSURE: 90 MMHG | BODY MASS INDEX: 36.29 KG/M2

## 2021-03-03 DIAGNOSIS — E11.9 TYPE 2 DIABETES MELLITUS WITHOUT COMPLICATION, WITH LONG-TERM CURRENT USE OF INSULIN (HCC): ICD-10-CM

## 2021-03-03 DIAGNOSIS — Z79.4 TYPE 2 DIABETES MELLITUS WITHOUT COMPLICATION, WITH LONG-TERM CURRENT USE OF INSULIN (HCC): ICD-10-CM

## 2021-03-03 DIAGNOSIS — Z00.01 ENCOUNTER FOR GENERAL ADULT MEDICAL EXAMINATION WITH ABNORMAL FINDINGS: Primary | ICD-10-CM

## 2021-03-03 DIAGNOSIS — R79.89 LOW VITAMIN D LEVEL: ICD-10-CM

## 2021-03-03 DIAGNOSIS — Z23 NEED FOR PNEUMOCOCCAL VACCINATION: ICD-10-CM

## 2021-03-03 LAB
CREAT UR-MCNC: 94.2 MG/DL
MICROALBUMIN UR-MCNC: 288 MG/L (ref 0–20)
MICROALBUMIN/CREAT 24H UR: 306 MG/G CREATININE (ref 0–30)
SL AMB POCT HEMOGLOBIN AIC: 7.7 (ref ?–6.5)

## 2021-03-03 PROCEDURE — 83036 HEMOGLOBIN GLYCOSYLATED A1C: CPT | Performed by: FAMILY MEDICINE

## 2021-03-03 PROCEDURE — 3060F POS MICROALBUMINURIA REV: CPT | Performed by: NURSE PRACTITIONER

## 2021-03-03 PROCEDURE — 90471 IMMUNIZATION ADMIN: CPT

## 2021-03-03 PROCEDURE — 82043 UR ALBUMIN QUANTITATIVE: CPT | Performed by: FAMILY MEDICINE

## 2021-03-03 PROCEDURE — 99396 PREV VISIT EST AGE 40-64: CPT | Performed by: FAMILY MEDICINE

## 2021-03-03 PROCEDURE — 3725F SCREEN DEPRESSION PERFORMED: CPT | Performed by: FAMILY MEDICINE

## 2021-03-03 PROCEDURE — 3051F HG A1C>EQUAL 7.0%<8.0%: CPT | Performed by: NURSE PRACTITIONER

## 2021-03-03 PROCEDURE — 82570 ASSAY OF URINE CREATININE: CPT | Performed by: FAMILY MEDICINE

## 2021-03-03 PROCEDURE — 90732 PPSV23 VACC 2 YRS+ SUBQ/IM: CPT

## 2021-03-03 NOTE — PROGRESS NOTES
Assessment/Plan:    No problem-specific Assessment & Plan notes found for this encounter  Diagnoses and all orders for this visit:    Encounter for general adult medical examination with abnormal findings  -     CBC and differential; Future    Low vitamin D level  -     Vitamin D 25 hydroxy; Future    Need for pneumococcal vaccination  -     PNEUMOCOCCAL POLYSACCHARIDE VACCINE 23-VALENT =>3YO SQ IM    BMI 35 0-35 9,adult    Type 2 diabetes mellitus without complication, with long-term current use of insulin (HCC)  -     Microalbumin / creatinine urine ratio  -     POCT hemoglobin A1c          Subjective:      Patient ID: Leonel Estevez is a 54 y o  male  Patient is here for PE, not having any acute distress  Patient is here also to follow Diabetes and HTN, patient states good compliance with treatment  Denies chest pain, shortness of breath, angina, urinary problems  No exercise but follows low salt diet  The following portions of the patient's history were reviewed and updated as appropriate: allergies, current medications, past family history, past medical history, past social history, past surgical history and problem list     Review of Systems   Constitutional: Negative for diaphoresis, fatigue, fever and unexpected weight change  Respiratory: Negative for apnea, cough, choking, chest tightness and shortness of breath  Cardiovascular: Negative for chest pain, palpitations and leg swelling  Gastrointestinal: Negative for abdominal distention, abdominal pain, anal bleeding, blood in stool and constipation  Musculoskeletal: Negative for arthralgias, back pain, gait problem and joint swelling  Neurological: Negative for dizziness, facial asymmetry, light-headedness and headaches  Psychiatric/Behavioral: Negative for behavioral problems, dysphoric mood and self-injury  The patient is not nervous/anxious            Objective:      /90 (BP Location: Left arm, Patient Position: Sitting, Cuff Size: Standard)   Pulse 80   Temp 98 °F (36 7 °C) (Temporal)   Resp 16   Ht 5' 9" (1 753 m)   Wt 111 kg (245 lb)   SpO2 98%   BMI 36 18 kg/m²          Physical Exam  Vitals signs and nursing note reviewed  Neck:      Musculoskeletal: No edema or neck rigidity  Thyroid: No thyroid mass or thyromegaly  Vascular: No carotid bruit or JVD  Trachea: No tracheal tenderness  Cardiovascular:      Rate and Rhythm: Normal rate and regular rhythm  No extrasystoles are present  Pulses: Normal pulses  no weak pulses          Dorsalis pedis pulses are 2+ on the right side and 2+ on the left side  Posterior tibial pulses are 2+ on the right side and 2+ on the left side  Heart sounds: Normal heart sounds  Heart sounds not distant  No friction rub  Pulmonary:      Effort: Pulmonary effort is normal  No tachypnea or bradypnea  Breath sounds: Normal breath sounds  No stridor  Abdominal:      General: Bowel sounds are normal  There is no abdominal bruit  Palpations: Abdomen is soft  There is no hepatomegaly or splenomegaly  Hernia: No hernia is present  Musculoskeletal: Normal range of motion  Feet:      Right foot:      Skin integrity: Callus present  No ulcer, skin breakdown, erythema, warmth or dry skin  Left foot:      Skin integrity: Callus present  No ulcer, skin breakdown, erythema, warmth or dry skin  Skin:     General: Skin is warm and dry  Neurological:      Mental Status: He is alert and oriented to person, place, and time  Deep Tendon Reflexes: Reflexes are normal and symmetric  Psychiatric:         Behavior: Behavior normal          Thought Content: Thought content normal          Judgment: Judgment normal        Patient's shoes and socks removed  Right Foot/Ankle   Right Foot Inspection  Skin Exam: skin normal, skin intact, callus and callus no dry skin, no warmth, no erythema, no maceration, no abnormal color, no pre-ulcer and no ulcer                          Toe Exam: ROM and strength within normal limitsno swelling, no tenderness, erythema and  no right toe deformity  Sensory   Vibration: intact  Proprioception: intact   Monofilament testing: intact  Vascular  Capillary refills: < 3 seconds  The right DP pulse is 2+  The right PT pulse is 2+  Left Foot/Ankle  Left Foot Inspection  Skin Exam: skin normal, skin intact and callusno dry skin, no warmth, no erythema, no maceration, normal color, no pre-ulcer and no ulcer                         Toe Exam: ROM and strength within normal limitsno swelling, no tenderness, no erythema and no left toe deformity                   Sensory   Vibration: intact  Proprioception: intact  Monofilament: intact  Vascular  Capillary refills: < 3 seconds  The left DP pulse is 2+  The left PT pulse is 2+  Assign Risk Category:  No deformity present; No loss of protective sensation; No weak pulses       Risk: 1    BMI Counseling: Body mass index is 36 18 kg/m²  The BMI is above normal  Nutrition recommendations include decreasing overall calorie intake, consuming healthier snacks and increasing intake of lean protein  Exercise recommendations include exercising 3-5 times per week

## 2021-03-08 DIAGNOSIS — I10 HYPERTENSION, UNSPECIFIED TYPE: ICD-10-CM

## 2021-03-09 RX ORDER — METOPROLOL TARTRATE 100 MG/1
TABLET ORAL
Qty: 90 TABLET | Refills: 1 | Status: SHIPPED | OUTPATIENT
Start: 2021-03-09 | End: 2021-04-27

## 2021-03-12 ENCOUNTER — TELEPHONE (OUTPATIENT)
Dept: OBGYN CLINIC | Facility: HOSPITAL | Age: 51
End: 2021-03-12

## 2021-03-12 ENCOUNTER — APPOINTMENT (OUTPATIENT)
Dept: URGENT CARE | Age: 51
End: 2021-03-12
Payer: OTHER MISCELLANEOUS

## 2021-03-12 PROCEDURE — 99213 OFFICE O/P EST LOW 20 MIN: CPT | Performed by: PREVENTIVE MEDICINE

## 2021-03-12 NOTE — TELEPHONE ENCOUNTER
Hello,  Please advise if the following patient can be forced onto the schedule:  Patient:  Kallie Rainey  : 1970  MRN: [id number]  Call back #  [phone]  Reason for appointment: Marva Red from [facility] called to schedule patient for a Tear in one of his wrists (she wasnt sure if it was left or right) Requested doctor/location: Denys or Eduardo/[place]    Emailed practice admin

## 2021-03-14 NOTE — RESULT ENCOUNTER NOTE
Please call  Patient, avoid any OTC medication  Take tylenol only for pain  Kidneys are having worsening effect of blood sugar  We farhat discuss this fact in more details at the next appt

## 2021-03-15 NOTE — TELEPHONE ENCOUNTER
I reached out to the patient to set him with an appt with hand but he declined saying the [facility] set him for appt somewhere for this Friday

## 2021-03-18 NOTE — TELEPHONE ENCOUNTER
Ish Daily from [facility] med called in checking status on appt  Relay msg above  Verbalized understanding

## 2021-03-19 ENCOUNTER — TELEPHONE (OUTPATIENT)
Dept: FAMILY MEDICINE CLINIC | Facility: CLINIC | Age: 51
End: 2021-03-19

## 2021-03-19 NOTE — TELEPHONE ENCOUNTER
Sarwat Nunez - [phone],  for Workers [employer] called into office to inform our provider that    Orthopaedic doctor is recommending patient receive an Steroid Injection to his wrist     patient needs pre-op clearance to have this done    Patient is not schedule for this procedural at this time pending his upcoming office visit 3/29/2021

## 2021-03-26 ENCOUNTER — LAB (OUTPATIENT)
Dept: LAB | Facility: HOSPITAL | Age: 51
End: 2021-03-26
Payer: COMMERCIAL

## 2021-03-26 DIAGNOSIS — R79.89 LOW VITAMIN D LEVEL: ICD-10-CM

## 2021-03-26 DIAGNOSIS — Z00.01 ENCOUNTER FOR GENERAL ADULT MEDICAL EXAMINATION WITH ABNORMAL FINDINGS: ICD-10-CM

## 2021-03-26 LAB
25(OH)D3 SERPL-MCNC: 22.4 NG/ML (ref 30–100)
BASOPHILS # BLD AUTO: 0.05 THOUSANDS/ΜL (ref 0–0.1)
BASOPHILS NFR BLD AUTO: 1 % (ref 0–1)
EOSINOPHIL # BLD AUTO: 0.16 THOUSAND/ΜL (ref 0–0.61)
EOSINOPHIL NFR BLD AUTO: 2 % (ref 0–6)
ERYTHROCYTE [DISTWIDTH] IN BLOOD BY AUTOMATED COUNT: 12.9 % (ref 11.6–15.1)
HCT VFR BLD AUTO: 41 % (ref 36.5–49.3)
HGB BLD-MCNC: 14 G/DL (ref 12–17)
IMM GRANULOCYTES # BLD AUTO: 0.09 THOUSAND/UL (ref 0–0.2)
IMM GRANULOCYTES NFR BLD AUTO: 1 % (ref 0–2)
LYMPHOCYTES # BLD AUTO: 1.7 THOUSANDS/ΜL (ref 0.6–4.47)
LYMPHOCYTES NFR BLD AUTO: 22 % (ref 14–44)
MCH RBC QN AUTO: 29.6 PG (ref 26.8–34.3)
MCHC RBC AUTO-ENTMCNC: 34.1 G/DL (ref 31.4–37.4)
MCV RBC AUTO: 87 FL (ref 82–98)
MONOCYTES # BLD AUTO: 0.36 THOUSAND/ΜL (ref 0.17–1.22)
MONOCYTES NFR BLD AUTO: 5 % (ref 4–12)
NEUTROPHILS # BLD AUTO: 5.53 THOUSANDS/ΜL (ref 1.85–7.62)
NEUTS SEG NFR BLD AUTO: 69 % (ref 43–75)
NRBC BLD AUTO-RTO: 0 /100 WBCS
PLATELET # BLD AUTO: 257 THOUSANDS/UL (ref 149–390)
PMV BLD AUTO: 10.9 FL (ref 8.9–12.7)
RBC # BLD AUTO: 4.73 MILLION/UL (ref 3.88–5.62)
WBC # BLD AUTO: 7.89 THOUSAND/UL (ref 4.31–10.16)

## 2021-03-26 PROCEDURE — 36415 COLL VENOUS BLD VENIPUNCTURE: CPT

## 2021-03-26 PROCEDURE — 82306 VITAMIN D 25 HYDROXY: CPT

## 2021-03-26 PROCEDURE — 85025 COMPLETE CBC W/AUTO DIFF WBC: CPT

## 2021-03-26 RX ORDER — LOPERAMIDE HYDROCHLORIDE 2 MG/1
CAPSULE ORAL
COMMUNITY
End: 2022-04-07

## 2021-03-26 RX ORDER — CYCLOBENZAPRINE HCL 10 MG
TABLET ORAL
COMMUNITY
End: 2022-04-07

## 2021-03-26 RX ORDER — MELOXICAM 15 MG/1
TABLET ORAL
COMMUNITY
End: 2021-04-27

## 2021-03-26 RX ORDER — CELECOXIB 100 MG/1
100 CAPSULE ORAL 2 TIMES DAILY
COMMUNITY
Start: 2021-03-19 | End: 2021-04-27

## 2021-03-29 ENCOUNTER — OFFICE VISIT (OUTPATIENT)
Dept: FAMILY MEDICINE CLINIC | Facility: CLINIC | Age: 51
End: 2021-03-29
Payer: COMMERCIAL

## 2021-03-29 VITALS
TEMPERATURE: 98 F | BODY MASS INDEX: 36.43 KG/M2 | SYSTOLIC BLOOD PRESSURE: 142 MMHG | RESPIRATION RATE: 20 BRPM | DIASTOLIC BLOOD PRESSURE: 100 MMHG | HEIGHT: 69 IN | HEART RATE: 82 BPM | WEIGHT: 246 LBS | OXYGEN SATURATION: 98 %

## 2021-03-29 DIAGNOSIS — E11.9 TYPE 2 DIABETES MELLITUS WITHOUT COMPLICATION, WITH LONG-TERM CURRENT USE OF INSULIN (HCC): ICD-10-CM

## 2021-03-29 DIAGNOSIS — I10 UNCONTROLLED HYPERTENSION: Primary | ICD-10-CM

## 2021-03-29 DIAGNOSIS — R80.9 ALBUMINURIA: ICD-10-CM

## 2021-03-29 DIAGNOSIS — Z79.4 TYPE 2 DIABETES MELLITUS WITHOUT COMPLICATION, WITH LONG-TERM CURRENT USE OF INSULIN (HCC): ICD-10-CM

## 2021-03-29 PROCEDURE — 99214 OFFICE O/P EST MOD 30 MIN: CPT | Performed by: NURSE PRACTITIONER

## 2021-03-29 PROCEDURE — 3080F DIAST BP >= 90 MM HG: CPT | Performed by: NURSE PRACTITIONER

## 2021-03-29 PROCEDURE — 3077F SYST BP >= 140 MM HG: CPT | Performed by: NURSE PRACTITIONER

## 2021-03-29 PROCEDURE — 3066F NEPHROPATHY DOC TX: CPT | Performed by: NURSE PRACTITIONER

## 2021-03-29 PROCEDURE — 3008F BODY MASS INDEX DOCD: CPT | Performed by: NURSE PRACTITIONER

## 2021-03-29 PROCEDURE — 1036F TOBACCO NON-USER: CPT | Performed by: NURSE PRACTITIONER

## 2021-03-29 NOTE — ASSESSMENT & PLAN NOTE
-pt BP improved from last visit however still elevated  His urine microalbumin has also been elevated despite use of ACE lisinopirl 40mg  He his also on metoprolol 100mg and amlodipine 10mg daily  He has been off work due to work injury and gained 24lbs  Pt was advised on weight loss  He stated ortho wanted clearance for a steroid injection, however after discussing that steroid injection may elevate his BP more than it is and his blood sugar as well I do not recommend he take a steroid injection at this time  Pt was advised and is aware   To f/u with nephrologist

## 2021-03-29 NOTE — PROGRESS NOTES
Assessment/Plan:    Type 2 diabetes mellitus without complication, with long-term current use of insulin ([facility])  -A1c from 3/2021 at 7 7% from 6 5%  Not at goal  Was started at previous visit on Ozempic 0 25mg weekly and is now increasing to Ozempic 0 5mg weekly  To continue on his insulin lantus 40 units and metformin  To follow up in 3 months  Lab Results   Component Value Date    HGBA1C 7 7 (A) 03/03/2021       Albuminuria  -referral to nephrologist    Uncontrolled hypertension  -pt BP improved from last visit however still elevated  His urine microalbumin has also been elevated despite use of ACE lisinopirl 40mg  He his also on metoprolol 100mg and amlodipine 10mg daily  He has been off work due to work injury and gained 24lbs  Pt was advised on weight loss  He stated ortho wanted clearance for a steroid injection, however after discussing that steroid injection may elevate his BP more than it is and his blood sugar as well I do not recommend he take a steroid injection at this time  Pt was advised and is aware  To f/u with nephrologist         Diagnoses and all orders for this visit:    Uncontrolled hypertension  -     Ambulatory referral to Nephrology; Future    Type 2 diabetes mellitus without complication, with long-term current use of insulin ([facility] )    Albuminuria  -     Ambulatory referral to Nephrology; Future          Subjective:      Patient ID: Syl Barboza is a 54 y o  male  54year old male patient here for follow up on his lab works  Microalbumin is elevated and his A1c went from 6 5% to 7 7% on 3/2021  Patient kidney function decreasing at this point  Was advised to avoid NSAIDs  He is also on max dose of his BP agents  We discussed seeing the nephrologist  Patient is started using Ozempic 0 25mg weekly, Lantus 40 units and metformin 1000mg  Patient states he has gained weight since being off work for 2 months   At this time it is not advised to use steroid injections as his BP is elevated and blood sugar as well  The following portions of the patient's history were reviewed and updated as appropriate: allergies, current medications, past family history, past medical history, past social history, past surgical history and problem list     Review of Systems   Constitutional: Positive for unexpected weight change  Negative for appetite change, fatigue and fever  HENT: Negative  Negative for congestion, ear pain, postnasal drip, rhinorrhea, sore throat and voice change  Eyes: Negative  Respiratory: Negative  Negative for cough, chest tightness, shortness of breath and wheezing  Cardiovascular: Negative  Negative for chest pain and palpitations  Gastrointestinal: Negative  Negative for abdominal distention  Endocrine: Negative  Genitourinary: Negative  Negative for difficulty urinating  Musculoskeletal: Positive for arthralgias  Skin: Negative  Negative for color change and rash  Allergic/Immunologic: Negative  Neurological: Negative  Negative for dizziness and headaches  Hematological: Negative  Does not bruise/bleed easily  Psychiatric/Behavioral: Negative  Objective:      /100 (BP Location: Left arm, Patient Position: Sitting, Cuff Size: Large)   Pulse 82   Temp 98 °F (36 7 °C) (Temporal)   Resp 20   Ht 5' 9" (1 753 m)   Wt 112 kg (246 lb)   SpO2 98%   BMI 36 33 kg/m²          Physical Exam  Vitals signs and nursing note reviewed  Constitutional:       General: He is not in acute distress  Appearance: Normal appearance  He is obese  He is not ill-appearing  HENT:      Head: Normocephalic and atraumatic  Right Ear: External ear normal       Left Ear: External ear normal       Nose: Nose normal       Mouth/Throat:      Pharynx: Oropharynx is clear  No oropharyngeal exudate  Eyes:      Conjunctiva/sclera: Conjunctivae normal    Neck:      Musculoskeletal: Normal range of motion and neck supple     Cardiovascular:      Rate and Rhythm: Normal rate and regular rhythm  Pulses: Normal pulses  Heart sounds: Normal heart sounds  Pulmonary:      Effort: Pulmonary effort is normal  No respiratory distress  Breath sounds: Normal breath sounds  Abdominal:      General: Bowel sounds are normal       Palpations: Abdomen is soft  Musculoskeletal: Normal range of motion  Skin:     General: Skin is warm and dry  Capillary Refill: Capillary refill takes less than 2 seconds  Neurological:      General: No focal deficit present  Mental Status: He is alert and oriented to person, place, and time     Psychiatric:         Mood and Affect: Mood normal          Behavior: Behavior normal              Chief Complaint   Patient presents with    Follow-up    Hypertension

## 2021-03-29 NOTE — RESULT ENCOUNTER NOTE
Dear Marty Rojas: Low vitamin D  Please take OTC vitamin 2,0000 units every day continuously for 3 months  Rest of labs are in normal limits

## 2021-03-29 NOTE — ASSESSMENT & PLAN NOTE
-A1c from 3/2021 at 7 7% from 6 5%  Not at goal  Was started at previous visit on Ozempic 0 25mg weekly and is now increasing to Ozempic 0 5mg weekly  To continue on his insulin lantus 40 units and metformin  To follow up in 3 months     Lab Results   Component Value Date    HGBA1C 7 7 (A) 03/03/2021

## 2021-04-06 DIAGNOSIS — F32.A DEPRESSION, UNSPECIFIED DEPRESSION TYPE: ICD-10-CM

## 2021-04-06 RX ORDER — SERTRALINE HYDROCHLORIDE 100 MG/1
TABLET, FILM COATED ORAL
Qty: 90 TABLET | Refills: 1 | Status: SHIPPED | OUTPATIENT
Start: 2021-04-06 | End: 2021-11-09

## 2021-04-27 ENCOUNTER — CONSULT (OUTPATIENT)
Dept: NEPHROLOGY | Facility: CLINIC | Age: 51
End: 2021-04-27
Payer: COMMERCIAL

## 2021-04-27 VITALS
HEIGHT: 69 IN | SYSTOLIC BLOOD PRESSURE: 144 MMHG | BODY MASS INDEX: 35.99 KG/M2 | HEART RATE: 74 BPM | DIASTOLIC BLOOD PRESSURE: 98 MMHG | WEIGHT: 243 LBS

## 2021-04-27 DIAGNOSIS — E78.5 DYSLIPIDEMIA: ICD-10-CM

## 2021-04-27 DIAGNOSIS — Z79.4 TYPE 2 DIABETES MELLITUS WITHOUT COMPLICATION, WITH LONG-TERM CURRENT USE OF INSULIN (HCC): ICD-10-CM

## 2021-04-27 DIAGNOSIS — I10 UNCONTROLLED HYPERTENSION: Primary | ICD-10-CM

## 2021-04-27 DIAGNOSIS — E66.01 CLASS 2 SEVERE OBESITY DUE TO EXCESS CALORIES WITH SERIOUS COMORBIDITY AND BODY MASS INDEX (BMI) OF 36.0 TO 36.9 IN ADULT (HCC): ICD-10-CM

## 2021-04-27 DIAGNOSIS — E11.9 TYPE 2 DIABETES MELLITUS WITHOUT COMPLICATION, WITH LONG-TERM CURRENT USE OF INSULIN (HCC): ICD-10-CM

## 2021-04-27 DIAGNOSIS — I10 ESSENTIAL HYPERTENSION: ICD-10-CM

## 2021-04-27 DIAGNOSIS — E55.9 VITAMIN D DEFICIENCY: ICD-10-CM

## 2021-04-27 DIAGNOSIS — N18.2 STAGE 2 CHRONIC KIDNEY DISEASE: ICD-10-CM

## 2021-04-27 DIAGNOSIS — R80.9 ALBUMINURIA: ICD-10-CM

## 2021-04-27 PROCEDURE — 3080F DIAST BP >= 90 MM HG: CPT | Performed by: INTERNAL MEDICINE

## 2021-04-27 PROCEDURE — 1036F TOBACCO NON-USER: CPT | Performed by: INTERNAL MEDICINE

## 2021-04-27 PROCEDURE — 3066F NEPHROPATHY DOC TX: CPT | Performed by: INTERNAL MEDICINE

## 2021-04-27 PROCEDURE — 99244 OFF/OP CNSLTJ NEW/EST MOD 40: CPT | Performed by: INTERNAL MEDICINE

## 2021-04-27 RX ORDER — HYDRALAZINE HYDROCHLORIDE 50 MG/1
50 TABLET, FILM COATED ORAL 2 TIMES DAILY
Qty: 60 TABLET | Refills: 1 | Status: SHIPPED | OUTPATIENT
Start: 2021-04-27 | End: 2021-08-05 | Stop reason: SDUPTHER

## 2021-04-27 RX ORDER — NIFEDIPINE 60 MG/1
60 TABLET, EXTENDED RELEASE ORAL
Qty: 30 TABLET | Refills: 1 | Status: SHIPPED | OUTPATIENT
Start: 2021-04-27 | End: 2021-09-15 | Stop reason: SDUPTHER

## 2021-04-27 RX ORDER — FUROSEMIDE 40 MG/1
40 TABLET ORAL DAILY
Qty: 30 TABLET | Refills: 1 | Status: SHIPPED | OUTPATIENT
Start: 2021-04-27 | End: 2021-07-23 | Stop reason: SDUPTHER

## 2021-04-27 NOTE — PROGRESS NOTES
Nephrology Initial Consultation  Savanna Fitzpatrick 54 y o  male MRN: [id number]            REASON FOR CONSULTATION:  Savanna Fitzpatrick is a 54 y o male who was referred by PEDRO Phelps for evaluation of Consult, Proteinuria, and Hypertension    ASSESSMENT / PLAN:   54 y o   male with pmh of multiple co-morbidities including  diabetes (x6yrs), HTN (x15yrs), depression, nephrolithiasis, vitamin-D deficiency, obesity, hyperlipidemia presents to the office for evaluation and management of  proteinuria  CKD stage IA3:  - After review of records in [facility] as well as Care everywhere patient has a baseline creatinine of 0 9-1 2 mg/dL dating as far back as 2013  Most recent labs show a Creatinine of 0 83 mg/dL on 11/04/2020  Renal function remains stable  -  Likely has underlying CKD secondary to diabetic kidney disease plus obesity related hyper filtration plus age-related nephron loss plus NSAID use  Will still rule out paraproteinemia  Check SPEP, UPEP, free light chain ratio  -  CT abdomen pelvis from January 2020 no hydronephrosis calcification in the upper pole of the right kidney 3 mm no renal masses  - Acid base and lytes stable  - Clinically the patient appears to be  Minimally hypervolemic   - Recommend to avoid use of NSAIDs, nephrotoxins  Caution advised with regards to exposure to IV contrast dye    - Discussed with the patient in depth  his renal status, including the possible etiologies for CKD  - Advised the patient that when  his GFR is close to 20mL/min then will start discussing about RRT(renal replacement therapy) options such as renal transplant, peritoneal dialysis and hemodialysis  - Informed the patient about the various options for Renal Replacement therapy    - Discussed with the patient how we need to work together to delay the progression of CKD with optimal BP control based on their age and co-morbidities, optimal BS control with HbA1c of <7% and trying to reduce proteinuria by the use of anti-proteinuric agents  Hypertension:  - Patient is on Norvasc 10 mg p o  q day , lisinopril 40 mg p o  q day, Lopressor 100 mg p o  q day  - DC Norvasc , Lopressor  -  Start Lasix 40 mg p o  q day, Procardia XL 60 mg p o  q h s , hydralazine 50 mg p o  b i d  hold if SBP less than 130   -  Advised patient appropriate techniques of checking blood   Pressures  -  Will rule out secondary hypertension check renin, aldosterone, plasma metanephrines, PTH   -   Likely contribution secondary to QUYEN  -   Referral placed to Pulmonary for sleep study evaluation  -  If blood pressure continues to be elevated despite above measures then may consider addition of Aldactone  -   Check blood work after the visit and then again in 1 month  -  Advised patient to call with blood pressure updates in 2 weeks  - Goal BP of <  140/90 based on age and comorbidities  - Instructed to follow low sodium (2gm)diet   - Advised to hold ACEI/ARBs if patient suffers from dehydration due to gastrointestinal losses due to risk of LARS secondary to failure to autoregulate  Hemoglobin:  - Goal Hb of 10-12 g/dL  - Most recent labs suggestive of  14 grams/deciliter    -  No role for IV iron at this time    CKD-MBD(Mineral Bone Disease) /vitamin-D deficiency:  - Based on patients CKD stage following is the goal of therapy    - Maintain calcium phosphorus product of < 55   - Patient is currently not at goal   - Continue patient on  Ergocalciferol 10944 units p o  Q weekly  - Start additional vitamin-D 2000 units p o  q day for 2 weeks  - Patients' most recent vitamin D level is  22 4 as of 03/26/2021  -  Check vitamin-D level prior to next visit    Proteinuria:  - proteinuria most likely secondary to diabetic kidney disease plus obesity related hyper filtration  - most recent microalbumin to creatinine ratio of 306 mg as of March 2021  - check protein creatinine ratio  - currently on therapy for proteinuria with  Lisinopril, vitamin-D, Lipitor  Will DC Norvasc and switch over to Procardia XL    Lipids:  -   On Lipitor  - goal LDL less than 70  - Management as per PCP    DM:  - management as per Primary team  - on insulin and metformin  - Advised patient when and if his renal parameters continued to deteriorate he will need to be taken off of metformin due to low GFR   - Advise of tight  Glycemic control in order to delay CKD progression  - Most recent A1c of 7 7% as of March 2021  - No evidence of diabetic retinopathy as of January 2020 eye exam      Nephrolithiasis:  - Discussed with the patient that the most common types of kidney stones are calcium oxalate stones  - Advised to follow a diet with increased fluid intake so that urine output is greater than 2-2 5L/day  - Advised patient to decrease salt, protein and oxalate intake  - Check ca, ipth, bmp    -  Will hold off on further workup at this time  Will concentrate on getting blood pressure is better controlled 1st    Nutrition /obesity:  - Encouraged patient to follow a renal diet comprising of moderate potassium, low phosphorus and protein restriction to 0 8gm/kg  advised about  dietary habits and weight loss  - Will check serum albumin with next blood work  Followup:  - Patient is to follow-up in 3 months, with lab work to be performed  After the visit and then again in 1 month and then again in a few days prior to the next visit  Advised patient to call me in 10 days to review the results if they do not hear back from me, as I may have not received the results  Shae Martinez MD, [facility], 4/27/2021, 3:13 PM             HISTORY OF PRESENT ILLNESS: 54 y o  Male with multiple comorbidities including diabetes (x6yrs), HTN (x15yrs), depression, nephrolithiasis, vitamin-D deficiency, obesity, hyperlipidemia presents to the office for evaluation and management of  proteinuria  Records reveal patient is on Mobic (x 2months), Motrin (x 2yrs), Celebrex (x1yr) for arthritis  Records reveal patient has a baseline creatinine 0 9-1 2 mg/dL dating as far back as 2013  Records reveal A1c as high as 10 1% in 2019  Most recent A1c of 7 7% as of March 2021  Most recent serum creatinine at 0 83 mg/dL as of November 2020  Has passed about 2 stones about 10 plus years ago  Was told 2 yrs ago that he didnt have QUYEN but reports recently was told by anesthesiologist that he has it and he reports snoring  Home sbp is about 180-190  Presents with his significant other  Reports he does have issues with edema  Never seen a nephrologist before  No recent hospitalizations  No history of Demetrio I needing dialysis  Thankful for all the care information that he has gotten today  Agreeable to medication changes as above  Review of Systems   Constitutional: Negative for appetite change, chills, fatigue and fever  HENT: Negative for congestion and sore throat  Respiratory: Negative for cough, shortness of breath and wheezing  Cardiovascular: Negative for leg swelling  Gastrointestinal: Positive for diarrhea  Negative for abdominal pain, constipation, nausea and vomiting  Genitourinary: Negative for difficulty urinating, dysuria and hematuria  Musculoskeletal: Negative for back pain  Chronic   Skin: Negative for rash and wound  Neurological: Negative for dizziness, light-headedness and headaches  Psychiatric/Behavioral: Negative for agitation and confusion  All other systems reviewed and are negative        PAST MEDICAL HISTORY:  Past Medical History:   Diagnosis Date    Depression     last assessed - 22Oct2012    Diabetes mellitus ([facility] [address] )     Hepatomegaly     Hypertension     Kidney stone     Obesity     Splenomegaly     Syncope     last assessed - [id number]    Vertigo     last assessed - [id number]       PROBLEM LIST    Patient Active Problem List   Diagnosis    Hypertension    Allergic rhinitis    Anxiety    Candidal balanitis    Neck pain    Reduced libido    Depression    Dyslipidemia    Fatigue    Microalbuminuria    Multiple joint pain    Obesity    Plantar fasciitis    Snoring    Tinea pedis    Trigger thumb of right hand    Vitamin D deficiency    Acute otitis media    Type 2 diabetes mellitus without complication, with long-term current use of insulin ([facility])    Acute bacterial sinusitis    Metformin adverse reaction    Other hemorrhoids    Episode of recurrent major depressive disorder ([address] )    Mixed hyperlipidemia    Essential hypertension    Screening for cholesterol level    Exposure to COVID-19 virus    Nausea    Diarrhea    Generalized body aches    Uncontrolled hypertension    Albuminuria    Stage 2 chronic kidney disease       PAST SURGICAL HISTORY:  Past Surgical History:   Procedure Laterality Date    NEUROPLASTY / TRANSPOSITION MEDIAN NERVE AT CARPAL TUNNEL      TONSILLECTOMY         SOCIAL HISTORY :   reports that he has never smoked  He uses smokeless tobacco  He reports current alcohol use of about 8 0 standard drinks of alcohol per week  He reports that he does not use drugs  FAMILY HISTORY:  Family History   Problem Relation Age of Onset    Hypertension Mother         Benign Essential HTN    Heart disease Mother     Sleep apnea Mother     Diabetes Father     Heart disease Father     Hypertension Father         Benign Essential HTN       ALLERGIES:  No Known Allergies        PHYSICAL EXAM:  Vitals:    04/27/21 1419 04/27/21 1446   BP: 160/90 144/98   BP Location: Right arm    Patient Position: Sitting    Cuff Size: Extra-Large    Pulse: 74    Weight: 110 kg (243 lb)    Height: 5' 9" (1 753 m)      Body mass index is 35 88 kg/m²  Physical Exam  Vitals signs reviewed  Constitutional:       General: He is not in acute distress  Appearance: Normal appearance  He is obese  He is not ill-appearing, toxic-appearing or diaphoretic  HENT:      Head: Normocephalic and atraumatic        Mouth/Throat:      Mouth: Mucous membranes are moist       Pharynx: Oropharynx is clear  No oropharyngeal exudate  Eyes:      General: No scleral icterus  Conjunctiva/sclera: Conjunctivae normal    Neck:      Musculoskeletal: Normal range of motion and neck supple  Cardiovascular:      Rate and Rhythm: Normal rate  Heart sounds: Normal heart sounds  No friction rub  Pulmonary:      Effort: Pulmonary effort is normal  No respiratory distress  Breath sounds: Normal breath sounds  No wheezing  Abdominal:      General: There is no distension  Palpations: Abdomen is soft  There is no mass  Tenderness: There is no abdominal tenderness  There is no right CVA tenderness or left CVA tenderness  Musculoskeletal:         General: No swelling  Skin:     General: Skin is warm  Coloration: Skin is not jaundiced  Neurological:      General: No focal deficit present  Mental Status: He is alert and oriented to person, place, and time     Psychiatric:         Mood and Affect: Mood normal          Behavior: Behavior normal            LABORATORY DATA:     Results from last 6 Months   Lab Units 03/26/21  0740 11/04/20  0706   WBC Thousand/uL 7 89  --    HEMOGLOBIN g/dL 14 0  --    HEMATOCRIT % 41 0  --    PLATELETS Thousands/uL 257  --    POTASSIUM mmol/L  --  3 7   CHLORIDE mmol/L  --  110*   CO2 mmol/L  --  28   BUN mg/dL  --  14   CREATININE mg/dL  --  0 83   CALCIUM mg/dL  --  8 6        rest all reviewed    RADIOLOGY:  No orders to display     Rest all reviewed        MEDICATIONS:    Current Outpatient Medications:     Accu-Chek FastClix Lancets MISC, by Does not apply route 2 (two) times a day, Disp: 60 each, Rfl: 5    ACCU-CHEK GUIDE test strip, USE 1 STRIP TWICE A DAY , Disp: 60 each, Rfl: 5    atorvastatin (LIPITOR) 40 mg tablet, Take 1 tablet (40 mg total) by mouth daily, Disp: 90 tablet, Rfl: 1    cyanocobalamin ([employer] VITAMIN B-12) 1000 MCG tablet, Take 1,000 mcg by mouth, Disp: , Rfl:    ergocalciferol (VITAMIN D2) 50,000 units, Take 1 capsule (50,000 Units total) by mouth once a week, Disp: 4 capsule, Rfl: 5    Insulin Pen Needle (B-D UF III MINI PEN NEEDLES) 31G X 5 MM MISC, Inject under the skin daily at bedtime, Disp: 100 each, Rfl: 3    lisinopril (ZESTRIL) 40 mg tablet, Take 1 tablet (40 mg total) by mouth daily, Disp: 90 tablet, Rfl: 1    metFORMIN (GLUCOPHAGE) 1000 MG tablet, Take 1 tablet (1,000 mg total) by mouth 2 (two) times a day with meals, Disp: 60 tablet, Rfl: 3    sertraline (ZOLOFT) 100 mg tablet, take 1 tablet by mouth ONCE DAILY, Disp: 90 tablet, Rfl: 1    cyclobenzaprine (FLEXERIL) 10 mg tablet, cyclobenzaprine 10 mg tablet  take 1 tablet by mouth DAILY at bedtime, Disp: , Rfl:     furosemide (LASIX) 40 mg tablet, Take 1 tablet (40 mg total) by mouth daily, Disp: 30 tablet, Rfl: 1    hydrALAZINE (APRESOLINE) 50 mg tablet, Take 1 tablet (50 mg total) by mouth 2 (two) times a day, Disp: 60 tablet, Rfl: 1    insulin glargine (Lantus SoloStar) 100 units/mL injection pen, Inject 40 Units under the skin daily at bedtime (Patient not taking: Reported on 4/27/2021), Disp: 12 pen, Rfl: 3    loperamide (IMODIUM) 2 mg capsule, loperamide 2 mg capsule  TAKE 1 CAPSULE BY MOUTH FOUR TIMES A DAY AS NEEDED FOR DIARRHEA, Disp: , Rfl:     NIFEdipine (PROCARDIA XL) 60 mg 24 hr tablet, Take 1 tablet (60 mg total) by mouth daily at bedtime, Disp: 30 tablet, Rfl: 1        Portions of the record may have been created with voice recognition software  Occasional wrong word or "sound a like" substitutions may have occurred due to the inherent limitations of voice recognition software  Read the chart carefully and recognize, using context, where substitutions have occurred  If you have any questions, please contact the dictating provider

## 2021-04-27 NOTE — PATIENT INSTRUCTIONS
- stop norvasc  - stop lopressor  - start procardia XL 60mg once at bedtime  - start lasix 40mg once a day  - start hydralazine 50mg twice a day hold if blood pressure top number is less than 130  - get blood work after the visit  - get blood work in 1 month  - call with blood pressure updates in 2 weeks  - take additional vit D 2000 units over the counter daily for 2 weeks  - Be seen by pulmonary to get sleep study done    - Please call me in 10 days after having your blood work done to review the results if you do not hear back from me or my office, as I may have not received the results  - please remember to perform blood work prior to the next visit  - Please call if the blood pressure top number is greater than 150 or less than 110 consistently  - Please call if you are gaining more than 2lbs in 2 days for adjustment of water pills   ~ Please AVOID the following pain medications  LIST OF NSAIDS (NONSTEROIDAL ANTI-INFLAMMATORY DRUGS) AND CANNON-2 INHIBITORS    DIFLUNISAL (DOLOBID)  IBUPROFEN (MOTRIN, ADVIL)  FLURBIPROFEN (ANSAID)  KETOPROFEN (ORUDIS, ORUVAIL)  FENOPROFEN (NALFON)  NABUMETONE (RELAFEN)  PIROXICAM (FELDENE)  NAPROXEN (ALEVE, NAPROSYN, NAPRELAN, ANAPROX)  DICLOFENAC (VOLTAREN, CATAFLAM)  INDOMETHACIN (INDOCIN)  SULINDAC (CLINORIL)  TOLMETIN (TOLETIN)  ETODOLAC (LODINE)  MELOXICAM (MOBIC)  KETOROLAC (TORADOL)  OXAPROZIN (DAYPRO)  CELECOXIB (CELEBREX)    Things to do to reduce your blood pressure include working with all your physician to do the following:  ~ stop smoking if you smoke  ~ increase cardiovascular exercise like walking and swimming    ~ modify your diet to decrease fat and salt intake  ~ reduce your weight if you are overweight or obese   ~ increase the consumption of fruits, vegetables and whole grains  ~ decrease alcohol consumption if you consume alcohol    ~ try to minimize stress in your life with lifestyle modifications     ~ be compliant with your anti-hypertensive medications  ~ adjust your medications to help improve your vascular stiffness and decrease risks for heart attacks and strokes  Exercise to Lose Weight     Exercise and a healthy diet may help you lose weight  Your doctor may suggest specific exercises  EXERCISE IDEAS AND TIPS      Choose low-cost things you enjoy doing, such as walking, bicycling, or exercising to workout videos   Take stairs instead of the elevator   Walk during your lunch break   Park your car further away from work or school   Go to a gym or an exercise class   Start with 5 to 10 minutes of exercise each day  Build up to 30 minutes of exercise 4 to 6 days a week   Wear shoes with good support and comfortable clothes   Stretch before and after working out   Work out until you breathe harder and your heart beats faster   Drink extra water when you exercise   Do not do so much that you hurt yourself, feel dizzy, or get very short of breath  Exercises that burn about 150 calories:    Running 1 ½ miles in 15 minutes   Playing volleyball for 45 to 60 minutes   Washing and waxing a car for 45 to 60 minutes   Playing touch football for 45 minutes   Walking 1 ¾ miles in 35 minutes   Pushing a stroller 1 ½ miles in 30 minutes   Playing basketball for 30 minutes   Raking leaves for 30 minutes   Bicycling 5 miles in 30 minutes   Walking 2 miles in 30 minutes   Dancing for 30 minutes   Shoveling snow for 15 minutes   Swimming laps for 20 minutes   Walking up stairs for 15 minutes   Bicycling 4 miles in 15 minutes   Gardening for 30 to 45 minutes   Jumping rope for 15 minutes  Washing windows or floors for 45 to 60 minutes

## 2021-04-28 ENCOUNTER — APPOINTMENT (OUTPATIENT)
Dept: LAB | Facility: CLINIC | Age: 51
End: 2021-04-28
Payer: COMMERCIAL

## 2021-04-28 DIAGNOSIS — E66.01 CLASS 2 SEVERE OBESITY DUE TO EXCESS CALORIES WITH SERIOUS COMORBIDITY AND BODY MASS INDEX (BMI) OF 36.0 TO 36.9 IN ADULT (HCC): ICD-10-CM

## 2021-04-28 DIAGNOSIS — I10 UNCONTROLLED HYPERTENSION: ICD-10-CM

## 2021-04-28 DIAGNOSIS — I10 ESSENTIAL HYPERTENSION: ICD-10-CM

## 2021-04-28 DIAGNOSIS — R80.9 ALBUMINURIA: ICD-10-CM

## 2021-04-28 DIAGNOSIS — N18.2 STAGE 2 CHRONIC KIDNEY DISEASE: ICD-10-CM

## 2021-04-28 LAB
BACTERIA UR QL AUTO: ABNORMAL /HPF
BILIRUB UR QL STRIP: NEGATIVE
CLARITY UR: CLEAR
COLOR UR: YELLOW
CREAT UR-MCNC: 81.2 MG/DL
GLUCOSE UR STRIP-MCNC: ABNORMAL MG/DL
HGB UR QL STRIP.AUTO: NEGATIVE
HYALINE CASTS #/AREA URNS LPF: ABNORMAL /LPF
KETONES UR STRIP-MCNC: NEGATIVE MG/DL
LEUKOCYTE ESTERASE UR QL STRIP: NEGATIVE
NITRITE UR QL STRIP: NEGATIVE
NON-SQ EPI CELLS URNS QL MICRO: ABNORMAL /HPF
PH UR STRIP.AUTO: 6 [PH]
PROT UR STRIP-MCNC: ABNORMAL MG/DL
PROT UR-MCNC: 36 MG/DL
PROT/CREAT UR: 0.44 MG/G{CREAT} (ref 0–0.1)
PTH-INTACT SERPL-MCNC: 79.8 PG/ML (ref 18.4–80.1)
RBC #/AREA URNS AUTO: ABNORMAL /HPF
SP GR UR STRIP.AUTO: 1.02 (ref 1–1.03)
UROBILINOGEN UR QL STRIP.AUTO: 0.2 E.U./DL
WBC #/AREA URNS AUTO: ABNORMAL /HPF

## 2021-04-28 PROCEDURE — 84244 ASSAY OF RENIN: CPT

## 2021-04-28 PROCEDURE — 84156 ASSAY OF PROTEIN URINE: CPT

## 2021-04-28 PROCEDURE — 84166 PROTEIN E-PHORESIS/URINE/CSF: CPT | Performed by: PATHOLOGY

## 2021-04-28 PROCEDURE — 84165 PROTEIN E-PHORESIS SERUM: CPT

## 2021-04-28 PROCEDURE — 83883 ASSAY NEPHELOMETRY NOT SPEC: CPT

## 2021-04-28 PROCEDURE — 84166 PROTEIN E-PHORESIS/URINE/CSF: CPT

## 2021-04-28 PROCEDURE — 83835 ASSAY OF METANEPHRINES: CPT

## 2021-04-28 PROCEDURE — 84165 PROTEIN E-PHORESIS SERUM: CPT | Performed by: PATHOLOGY

## 2021-04-28 PROCEDURE — 81001 URINALYSIS AUTO W/SCOPE: CPT

## 2021-04-28 PROCEDURE — 82088 ASSAY OF ALDOSTERONE: CPT

## 2021-04-28 PROCEDURE — 36415 COLL VENOUS BLD VENIPUNCTURE: CPT

## 2021-04-28 PROCEDURE — 83970 ASSAY OF PARATHORMONE: CPT

## 2021-04-28 PROCEDURE — 82570 ASSAY OF URINE CREATININE: CPT

## 2021-04-29 ENCOUNTER — TELEPHONE (OUTPATIENT)
Dept: NEPHROLOGY | Facility: CLINIC | Age: 51
End: 2021-04-29

## 2021-04-29 LAB
ALBUMIN SERPL ELPH-MCNC: 4.37 G/DL (ref 3.5–5)
ALBUMIN SERPL ELPH-MCNC: 59.8 % (ref 52–65)
ALBUMIN UR ELPH-MCNC: 100 %
ALPHA1 GLOB MFR UR ELPH: 0 %
ALPHA1 GLOB SERPL ELPH-MCNC: 0.34 G/DL (ref 0.1–0.4)
ALPHA1 GLOB SERPL ELPH-MCNC: 4.6 % (ref 2.5–5)
ALPHA2 GLOB MFR UR ELPH: 0 %
ALPHA2 GLOB SERPL ELPH-MCNC: 0.66 G/DL (ref 0.4–1.2)
ALPHA2 GLOB SERPL ELPH-MCNC: 9 % (ref 7–13)
B-GLOBULIN MFR UR ELPH: 0 %
BETA GLOB ABNORMAL SERPL ELPH-MCNC: 0.47 G/DL (ref 0.4–0.8)
BETA1 GLOB SERPL ELPH-MCNC: 6.5 % (ref 5–13)
BETA2 GLOB SERPL ELPH-MCNC: 5.6 % (ref 2–8)
BETA2+GAMMA GLOB SERPL ELPH-MCNC: 0.41 G/DL (ref 0.2–0.5)
GAMMA GLOB ABNORMAL SERPL ELPH-MCNC: 1.06 G/DL (ref 0.5–1.6)
GAMMA GLOB MFR UR ELPH: 0 %
GAMMA GLOB SERPL ELPH-MCNC: 14.5 % (ref 12–22)
IGG/ALB SER: 1.49 {RATIO} (ref 1.1–1.8)
KAPPA LC FREE SER-MCNC: 13.6 MG/L (ref 3.3–19.4)
KAPPA LC FREE/LAMBDA FREE SER: 1.2 {RATIO} (ref 0.26–1.65)
LAMBDA LC FREE SERPL-MCNC: 11.3 MG/L (ref 5.7–26.3)
PROT PATTERN SERPL ELPH-IMP: NORMAL
PROT PATTERN UR ELPH-IMP: ABNORMAL
PROT SERPL-MCNC: 7.3 G/DL (ref 6.4–8.2)
PROT UR-MCNC: 35 MG/DL

## 2021-04-29 PROCEDURE — 3060F POS MICROALBUMINURIA REV: CPT | Performed by: INTERNAL MEDICINE

## 2021-05-06 LAB
ALDOST SERPL-MCNC: 6.6 NG/DL (ref 0–30)
ALDOST/RENIN PLAS-RTO: 15.7 {RATIO} (ref 0–30)
RENIN PLAS-CCNC: 0.42 NG/ML/HR (ref 0.17–5.38)

## 2021-05-08 LAB
METANEPH FREE SERPL-MCNC: 22 PG/ML (ref 0–88)
NORMETANEPHRINE SERPL-MCNC: 48.4 PG/ML (ref 0–125.8)

## 2021-05-25 ENCOUNTER — IMMUNIZATIONS (OUTPATIENT)
Dept: FAMILY MEDICINE CLINIC | Facility: HOSPITAL | Age: 51
End: 2021-05-25

## 2021-05-25 DIAGNOSIS — Z23 ENCOUNTER FOR IMMUNIZATION: Primary | ICD-10-CM

## 2021-05-25 PROCEDURE — 0011A SARS-COV-2 / COVID-19 MRNA VACCINE (MODERNA) 100 MCG: CPT

## 2021-05-25 PROCEDURE — 91301 SARS-COV-2 / COVID-19 MRNA VACCINE (MODERNA) 100 MCG: CPT

## 2021-06-22 ENCOUNTER — IMMUNIZATIONS (OUTPATIENT)
Dept: FAMILY MEDICINE CLINIC | Facility: HOSPITAL | Age: 51
End: 2021-06-22

## 2021-06-22 DIAGNOSIS — Z23 ENCOUNTER FOR IMMUNIZATION: Primary | ICD-10-CM

## 2021-06-22 PROCEDURE — 0012A SARS-COV-2 / COVID-19 MRNA VACCINE (MODERNA) 100 MCG: CPT

## 2021-06-22 PROCEDURE — 91301 SARS-COV-2 / COVID-19 MRNA VACCINE (MODERNA) 100 MCG: CPT

## 2021-06-23 ENCOUNTER — APPOINTMENT (OUTPATIENT)
Dept: LAB | Facility: CLINIC | Age: 51
End: 2021-06-23
Payer: COMMERCIAL

## 2021-06-23 ENCOUNTER — RA CDI HCC (OUTPATIENT)
Dept: OTHER | Facility: HOSPITAL | Age: 51
End: 2021-06-23

## 2021-06-23 DIAGNOSIS — E66.01 CLASS 2 SEVERE OBESITY DUE TO EXCESS CALORIES WITH SERIOUS COMORBIDITY AND BODY MASS INDEX (BMI) OF 36.0 TO 36.9 IN ADULT (HCC): ICD-10-CM

## 2021-06-23 DIAGNOSIS — I10 UNCONTROLLED HYPERTENSION: ICD-10-CM

## 2021-06-23 DIAGNOSIS — R80.9 ALBUMINURIA: ICD-10-CM

## 2021-06-23 DIAGNOSIS — E55.9 VITAMIN D DEFICIENCY: ICD-10-CM

## 2021-06-23 DIAGNOSIS — I10 ESSENTIAL HYPERTENSION: ICD-10-CM

## 2021-06-23 DIAGNOSIS — N18.2 STAGE 2 CHRONIC KIDNEY DISEASE: ICD-10-CM

## 2021-06-23 LAB
25(OH)D3 SERPL-MCNC: 26.6 NG/ML (ref 30–100)
ALBUMIN SERPL BCP-MCNC: 4 G/DL (ref 3.5–5)
ANION GAP SERPL CALCULATED.3IONS-SCNC: 7 MMOL/L (ref 4–13)
BUN SERPL-MCNC: 13 MG/DL (ref 5–25)
CALCIUM SERPL-MCNC: 9.3 MG/DL (ref 8.3–10.1)
CHLORIDE SERPL-SCNC: 106 MMOL/L (ref 100–108)
CO2 SERPL-SCNC: 24 MMOL/L (ref 21–32)
CREAT SERPL-MCNC: 0.94 MG/DL (ref 0.6–1.3)
CREAT UR-MCNC: 50.6 MG/DL
GFR SERPL CREATININE-BSD FRML MDRD: 95 ML/MIN/1.73SQ M
GLUCOSE P FAST SERPL-MCNC: 275 MG/DL (ref 65–99)
MAGNESIUM SERPL-MCNC: 2.1 MG/DL (ref 1.6–2.6)
PHOSPHATE SERPL-MCNC: 2.6 MG/DL (ref 2.7–4.5)
POTASSIUM SERPL-SCNC: 3.6 MMOL/L (ref 3.5–5.3)
PROT UR-MCNC: 28 MG/DL
PROT/CREAT UR: 0.55 MG/G{CREAT} (ref 0–0.1)
SODIUM SERPL-SCNC: 137 MMOL/L (ref 136–145)

## 2021-06-23 PROCEDURE — 80069 RENAL FUNCTION PANEL: CPT

## 2021-06-23 PROCEDURE — 3061F NEG MICROALBUMINURIA REV: CPT | Performed by: NURSE PRACTITIONER

## 2021-06-23 PROCEDURE — 84156 ASSAY OF PROTEIN URINE: CPT

## 2021-06-23 PROCEDURE — 36415 COLL VENOUS BLD VENIPUNCTURE: CPT

## 2021-06-23 PROCEDURE — 83735 ASSAY OF MAGNESIUM: CPT

## 2021-06-23 PROCEDURE — 82570 ASSAY OF URINE CREATININE: CPT

## 2021-06-23 PROCEDURE — 82306 VITAMIN D 25 HYDROXY: CPT

## 2021-06-23 PROCEDURE — 3066F NEPHROPATHY DOC TX: CPT | Performed by: NURSE PRACTITIONER

## 2021-06-23 NOTE — PROGRESS NOTES
[place] [id number]  coding opportunities             Chart reviewed, (number of) suggestions sent to provider: 1     Problem listed updated   Provider Accepted, (number of) suggestions accepted: 1            Number of suggestions NOT actually used: 1     Patients insurance company: [employer] (Medicare Advantage and Commercial)     Visit status: Patient arrived for their scheduled appointment        [place] [id number]  coding opportunities             Chart reviewed, (number of) suggestions sent to provider: 1                  Patients insurance company: [employer] (Medicare Advantage and Commercial)             F33 9: Episode of recurrent major depressive disorder ([place] [id number] ) - Please review for current status and assess and document, if applicable - found in problem list

## 2021-06-29 ENCOUNTER — OFFICE VISIT (OUTPATIENT)
Dept: FAMILY MEDICINE CLINIC | Facility: CLINIC | Age: 51
End: 2021-06-29
Payer: COMMERCIAL

## 2021-06-29 VITALS
HEIGHT: 69 IN | HEART RATE: 108 BPM | RESPIRATION RATE: 18 BRPM | BODY MASS INDEX: 34.96 KG/M2 | OXYGEN SATURATION: 99 % | TEMPERATURE: 98.3 F | WEIGHT: 236 LBS | DIASTOLIC BLOOD PRESSURE: 90 MMHG | SYSTOLIC BLOOD PRESSURE: 138 MMHG

## 2021-06-29 DIAGNOSIS — E11.65 TYPE 2 DIABETES MELLITUS WITH HYPERGLYCEMIA, WITHOUT LONG-TERM CURRENT USE OF INSULIN (HCC): ICD-10-CM

## 2021-06-29 DIAGNOSIS — E11.9 TYPE 2 DIABETES MELLITUS WITHOUT COMPLICATION, WITH LONG-TERM CURRENT USE OF INSULIN (HCC): Primary | ICD-10-CM

## 2021-06-29 DIAGNOSIS — E78.2 MIXED HYPERLIPIDEMIA: ICD-10-CM

## 2021-06-29 DIAGNOSIS — Z11.59 ENCOUNTER FOR HEPATITIS C SCREENING TEST FOR LOW RISK PATIENT: ICD-10-CM

## 2021-06-29 DIAGNOSIS — Z79.4 TYPE 2 DIABETES MELLITUS WITHOUT COMPLICATION, WITH LONG-TERM CURRENT USE OF INSULIN (HCC): Primary | ICD-10-CM

## 2021-06-29 DIAGNOSIS — N18.9 CHRONIC KIDNEY DISEASE, UNSPECIFIED CKD STAGE: ICD-10-CM

## 2021-06-29 PROBLEM — F33.2 SEVERE EPISODE OF RECURRENT MAJOR DEPRESSIVE DISORDER, WITHOUT PSYCHOTIC FEATURES (HCC): Status: ACTIVE | Noted: 2021-06-29

## 2021-06-29 PROCEDURE — 99214 OFFICE O/P EST MOD 30 MIN: CPT | Performed by: NURSE PRACTITIONER

## 2021-06-29 PROCEDURE — 1036F TOBACCO NON-USER: CPT | Performed by: NURSE PRACTITIONER

## 2021-06-29 RX ORDER — ATORVASTATIN CALCIUM 40 MG/1
40 TABLET, FILM COATED ORAL DAILY
Qty: 90 TABLET | Refills: 1 | Status: SHIPPED | OUTPATIENT
Start: 2021-06-29 | End: 2021-11-08 | Stop reason: SDUPTHER

## 2021-06-29 RX ORDER — INSULIN GLARGINE 100 [IU]/ML
40 INJECTION, SOLUTION SUBCUTANEOUS
Qty: 12 ML | Refills: 3 | Status: SHIPPED | OUTPATIENT
Start: 2021-06-29

## 2021-06-29 NOTE — ASSESSMENT & PLAN NOTE
-Ordering repeat A1c to be done  Pt admits to only taking metformin 1000mg BID and did not get his LAntus as it was too expensive  I advised we have resources to help getting medication cheaper or more affordable  I am going to reorder lantus for patient and have social work help with getting this medication cheaper for patient  Pt in agreement with plan  Has lost some weight as well and has modified his diet  To continue this regimen and f/u in 3 months     Lab Results   Component Value Date    HGBA1C 7 7 (A) 03/03/2021

## 2021-06-29 NOTE — ASSESSMENT & PLAN NOTE
Lab Results   Component Value Date    EGFR 95 06/23/2021    EGFR 104 11/04/2020    EGFR 107 08/04/2019    CREATININE 0 94 06/23/2021    CREATININE 0 83 11/04/2020    CREATININE 0 78 08/04/2019       -pt currently under care of nephrologist for this  Did adjust some of his medications for blood pressure  If his renal parameters decrease he will need to come off metformin pt aware of this  Has pending labs he needs to get done  Avoid NSAIDS  To f/u with nephrologist as ordered

## 2021-06-29 NOTE — PROGRESS NOTES
Assessment/Plan:    Type 2 diabetes mellitus without complication, with long-term current use of insulin (HCC)  -Ordering repeat A1c to be done  Pt admits to only taking metformin 1000mg BID and did not get his LAntus as it was too expensive  I advised we have resources to help getting medication cheaper or more affordable  I am going to reorder lantus for patient and have social work help with getting this medication cheaper for patient  Pt in agreement with plan  Has lost some weight as well and has modified his diet  To continue this regimen and f/u in 3 months  Lab Results   Component Value Date    HGBA1C 7 7 (A) 03/03/2021       Chronic kidney disease  Lab Results   Component Value Date    EGFR 95 06/23/2021    EGFR 104 11/04/2020    EGFR 107 08/04/2019    CREATININE 0 94 06/23/2021    CREATININE 0 83 11/04/2020    CREATININE 0 78 08/04/2019       -pt currently under care of nephrologist for this  Did adjust some of his medications for blood pressure  If his renal parameters decrease he will need to come off metformin pt aware of this  Has pending labs he needs to get done  Avoid NSAIDS  To f/u with nephrologist as ordered  Diagnoses and all orders for this visit:    Type 2 diabetes mellitus without complication, with long-term current use of insulin ([facility] [address] )  -     Lipid panel; Future  -     HEMOGLOBIN A1C W/ EAG ESTIMATION; Future  -     insulin glargine (Lantus SoloStar) 100 units/mL injection pen; Inject 40 Units under the skin daily at bedtime  -     Ambulatory referral to social work care management program; Future    Chronic kidney disease, unspecified CKD stage    Encounter for hepatitis C screening test for low risk patient  -     Hepatitis C antibody; Future    Mixed hyperlipidemia  -     atorvastatin (LIPITOR) 40 mg tablet;  Take 1 tablet (40 mg total) by mouth daily    Type 2 diabetes mellitus with hyperglycemia, without long-term current use of insulin (HCC)  -     metFORMIN (GLUCOPHAGE) 1000 MG tablet; Take 1 tablet (1,000 mg total) by mouth 2 (two) times a day with meals    Other orders  -     Cancel: POCT hemoglobin A1c          Subjective:      Patient ID: Nakita Smith is a 54 y o  male  54year old male patient here for follow up on diabetes  Currently metformin 1000mg BID and Lantus at bedtime but could not get this and is only on metformin 1000mg BID  States he is eating healthy and has changed his diet and lost weight  Diabetes  He presents for his follow-up diabetic visit  He has type 2 diabetes mellitus  His disease course has been fluctuating  There are no hypoglycemic associated symptoms  Pertinent negatives for hypoglycemia include no dizziness or headaches  Associated symptoms include weight loss  Pertinent negatives for diabetes include no fatigue  There are no hypoglycemic complications  Diabetic complications include heart disease and nephropathy  Pertinent negatives for diabetic complications include no CVA or impotence  Risk factors for coronary artery disease include diabetes mellitus, dyslipidemia, family history, obesity, male sex, hypertension and sedentary lifestyle  Current diabetic treatment includes insulin injections  His weight is decreasing steadily  He is following a generally healthy diet  Meal planning includes avoidance of concentrated sweets  He participates in exercise intermittently  An ACE inhibitor/angiotensin II receptor blocker is being taken  He does not see a podiatrist Eye exam is not current  The following portions of the patient's history were reviewed and updated as appropriate: allergies, current medications, past family history, past medical history, past social history, past surgical history and problem list     Review of Systems   Constitutional: Positive for weight loss  Negative for appetite change, fatigue and fever  HENT: Negative  Negative for congestion, ear pain, postnasal drip, rhinorrhea, sore throat and voice change      Eyes: Negative  Respiratory: Negative  Negative for cough, chest tightness, shortness of breath and wheezing  Cardiovascular: Negative  Negative for palpitations  Gastrointestinal: Negative  Negative for abdominal distention  Endocrine: Negative  Genitourinary: Negative  Negative for difficulty urinating and impotence  Musculoskeletal: Negative  Negative for arthralgias  Skin: Negative  Negative for color change and rash  Allergic/Immunologic: Negative  Neurological: Negative  Negative for dizziness and headaches  Hematological: Negative  Does not bruise/bleed easily  Psychiatric/Behavioral: Negative  Objective:      /90 (BP Location: Left arm, Patient Position: Sitting, Cuff Size: Standard)   Pulse (!) 108   Temp 98 3 °F (36 8 °C) (Temporal)   Resp 18   Ht 5' 9" (1 753 m)   Wt 107 kg (236 lb)   SpO2 99%   BMI 34 85 kg/m²          Physical Exam  Vitals and nursing note reviewed  Constitutional:       General: He is not in acute distress  Appearance: Normal appearance  He is obese  He is not ill-appearing  HENT:      Head: Normocephalic and atraumatic  Right Ear: External ear normal       Left Ear: External ear normal       Nose: Nose normal       Mouth/Throat:      Pharynx: Oropharynx is clear  No oropharyngeal exudate  Eyes:      Conjunctiva/sclera: Conjunctivae normal    Cardiovascular:      Rate and Rhythm: Normal rate and regular rhythm  Pulses: Normal pulses  Heart sounds: Normal heart sounds  Pulmonary:      Effort: Pulmonary effort is normal  No respiratory distress  Breath sounds: Normal breath sounds  Musculoskeletal:         General: Normal range of motion  Cervical back: Normal range of motion and neck supple  Skin:     General: Skin is warm and dry  Capillary Refill: Capillary refill takes less than 2 seconds  Neurological:      General: No focal deficit present        Mental Status: He is alert and oriented to person, place, and time     Psychiatric:         Mood and Affect: Mood normal          Behavior: Behavior normal              Chief Complaint   Patient presents with    Follow-up    Diabetes

## 2021-06-30 ENCOUNTER — APPOINTMENT (OUTPATIENT)
Dept: LAB | Facility: CLINIC | Age: 51
End: 2021-06-30
Payer: COMMERCIAL

## 2021-06-30 DIAGNOSIS — Z79.4 TYPE 2 DIABETES MELLITUS WITHOUT COMPLICATION, WITH LONG-TERM CURRENT USE OF INSULIN (HCC): ICD-10-CM

## 2021-06-30 DIAGNOSIS — Z11.59 ENCOUNTER FOR HEPATITIS C SCREENING TEST FOR LOW RISK PATIENT: ICD-10-CM

## 2021-06-30 DIAGNOSIS — E11.9 TYPE 2 DIABETES MELLITUS WITHOUT COMPLICATION, WITH LONG-TERM CURRENT USE OF INSULIN (HCC): ICD-10-CM

## 2021-06-30 LAB
CHOLEST SERPL-MCNC: 85 MG/DL (ref 50–200)
EST. AVERAGE GLUCOSE BLD GHB EST-MCNC: 192 MG/DL
HBA1C MFR BLD: 8.3 %
HCV AB SER QL: NORMAL
HDLC SERPL-MCNC: 21 MG/DL
LDLC SERPL CALC-MCNC: 3 MG/DL (ref 0–100)
NONHDLC SERPL-MCNC: 64 MG/DL
TRIGL SERPL-MCNC: 303 MG/DL

## 2021-06-30 PROCEDURE — 3052F HG A1C>EQUAL 8.0%<EQUAL 9.0%: CPT | Performed by: NURSE PRACTITIONER

## 2021-06-30 PROCEDURE — 83036 HEMOGLOBIN GLYCOSYLATED A1C: CPT

## 2021-06-30 PROCEDURE — 80061 LIPID PANEL: CPT

## 2021-06-30 PROCEDURE — 36415 COLL VENOUS BLD VENIPUNCTURE: CPT

## 2021-06-30 PROCEDURE — 86803 HEPATITIS C AB TEST: CPT

## 2021-07-16 ENCOUNTER — TELEPHONE (OUTPATIENT)
Dept: PULMONOLOGY | Facility: CLINIC | Age: 51
End: 2021-07-16

## 2021-07-16 ENCOUNTER — OFFICE VISIT (OUTPATIENT)
Dept: NEPHROLOGY | Facility: CLINIC | Age: 51
End: 2021-07-16
Payer: COMMERCIAL

## 2021-07-16 VITALS
SYSTOLIC BLOOD PRESSURE: 120 MMHG | HEART RATE: 81 BPM | HEIGHT: 69 IN | WEIGHT: 236.2 LBS | DIASTOLIC BLOOD PRESSURE: 82 MMHG | BODY MASS INDEX: 34.98 KG/M2

## 2021-07-16 DIAGNOSIS — I10 RESISTANT HYPERTENSION: Primary | ICD-10-CM

## 2021-07-16 DIAGNOSIS — I10 ESSENTIAL HYPERTENSION: ICD-10-CM

## 2021-07-16 DIAGNOSIS — E66.09 CLASS 1 OBESITY DUE TO EXCESS CALORIES WITH SERIOUS COMORBIDITY AND BODY MASS INDEX (BMI) OF 34.0 TO 34.9 IN ADULT: ICD-10-CM

## 2021-07-16 DIAGNOSIS — R80.9 ALBUMINURIA: ICD-10-CM

## 2021-07-16 DIAGNOSIS — E11.9 TYPE 2 DIABETES MELLITUS WITHOUT COMPLICATION, WITH LONG-TERM CURRENT USE OF INSULIN (HCC): ICD-10-CM

## 2021-07-16 DIAGNOSIS — N18.1 STAGE 1 CHRONIC KIDNEY DISEASE: ICD-10-CM

## 2021-07-16 DIAGNOSIS — N20.0 NEPHROLITHIASIS: ICD-10-CM

## 2021-07-16 DIAGNOSIS — Z79.4 TYPE 2 DIABETES MELLITUS WITHOUT COMPLICATION, WITH LONG-TERM CURRENT USE OF INSULIN (HCC): ICD-10-CM

## 2021-07-16 PROBLEM — I1A.0 RESISTANT HYPERTENSION: Status: ACTIVE | Noted: 2021-03-29

## 2021-07-16 PROCEDURE — 99214 OFFICE O/P EST MOD 30 MIN: CPT | Performed by: INTERNAL MEDICINE

## 2021-07-16 PROCEDURE — 3008F BODY MASS INDEX DOCD: CPT | Performed by: NURSE PRACTITIONER

## 2021-07-16 NOTE — PATIENT INSTRUCTIONS
- start vit D 2000 units additional daily over the counter  - be seen by pulmonary    - Please call me in 10 days after having your blood work done to review the results if you do not hear back from me or my office, as I may have not received the results  - please remember to perform blood work prior to the next visit  - Please call if the blood pressure top number is greater than 150 or less than 110 consistently  - Please call if you are gaining more than 2lbs in 2 days for adjustment of water pills   ~ Please AVOID the following pain medications  LIST OF NSAIDS (NONSTEROIDAL ANTI-INFLAMMATORY DRUGS) AND CANNON-2 INHIBITORS    DIFLUNISAL (DOLOBID)  IBUPROFEN (MOTRIN, ADVIL)  FLURBIPROFEN (ANSAID)  KETOPROFEN (ORUDIS, ORUVAIL)  FENOPROFEN (NALFON)  NABUMETONE (RELAFEN)  PIROXICAM (FELDENE)  NAPROXEN (ALEVE, NAPROSYN, NAPRELAN, ANAPROX)  DICLOFENAC (VOLTAREN, CATAFLAM)  INDOMETHACIN (INDOCIN)  SULINDAC (CLINORIL)  TOLMETIN (TOLETIN)  ETODOLAC (LODINE)  MELOXICAM (MOBIC)  KETOROLAC (TORADOL)  OXAPROZIN (DAYPRO)  CELECOXIB (CELEBREX)    Things to do to reduce your blood pressure include working with all your physician to do the following:  ~ stop smoking if you smoke  ~ increase cardiovascular exercise like walking and swimming    ~ modify your diet to decrease fat and salt intake  ~ reduce your weight if you are overweight or obese   ~ increase the consumption of fruits, vegetables and whole grains  ~ decrease alcohol consumption if you consume alcohol    ~ try to minimize stress in your life with lifestyle modifications  ~ be compliant with your anti-hypertensive medications  ~ adjust your medications to help improve your vascular stiffness and decrease risks for heart attacks and strokes  Kidney Stone Prevention    Fluid Intake    A high fluid intake is one of the most important cornerstones of kidney stone dietary prevention   A sufficiently dilute urine will prevent the individual chemical components of stones from becoming concentrated enough to precipitate out of solution, keeping them instead in their dissolved state  A high urine output also may reduce stone from forming through "flushing" out of stone components and prevention of urine stagnation  In addition to stone benefits, increased water intake has been shown to have a multitude of other benefits, including improved alertness, better skin appearance, enhanced physical performance, reduced constipation and enhanced weight loss  The average daily urine output for normal healthy adults is 1 2 liters a day, ranging from 1 to 2 liters in most individuals and varying with body weight and gender  In stone formers, however, a higher daily urine output is required for stone prevention and achieving a daily volume of at least 2 0 to 2 5 liters a day can significantly reduce the recurrence of future stones  In a randomized study of stone formers who were given specific instructions to increase their fluid intake compared to stone formers told to not change their diet, those given specific fluid instructions achieved a high urine output of 2 6 liters a day versus 1 0 liters a day in those not given dietary instructions  Over a period of five years, the high fluid intake group was half as likely to form new stones as compared to the normal fluid intake group (Radhames et al, J Urol 1996)  We recommend that most stone formers increase their daily fluid intake by one liter (an additional two 16 oz water bottles or two tall glasses a day) in order to achieve a urine output of 2 5 liters a day  (One liter = 4 2 8-oz glasses or 34 oz)  Alternatively, a 24 hour urine collection can be performed to guide fluid intake to achieve 2 5 liters of urine output in a specific patient  Type of Fluid Intake    The type of fluid intake is generally less important than the total intake   While drinking water is our preferred recommendation because it is inexpensive and contains no calories, for stone patients who do not enjoy drinking water, any beverage will be beneficial in reducing stone risk  Contrary to popular belief, studies have found that an increased intake of tea, coffee, and alcoholic beverage actually reduces the risk of stones, possibly because of an associated increase in urine output (Freya sims al, Am J of Epidemiology, 1996)  While tea contains high levels of oxalate, this does not appear to result in increased stone formation for the reasons discussed below in discussion on oxalate  Soda intake (including santos) and milk intake also do not appear to increase the risk of stones  Citrus Fruit Juices    Citrus juices, including lemon juice and orange juice, contain citrate, which acts as a stone inhibitor for calcium based stones  Citrate seems to do this by binding calcium, making it unavailable to combine with oxalate or phosphate: a necessary first step in the formation of stones  Citrate also seems to make it more difficult for stones to grow once they've formed  Drinking citrus juice in the form of concentrated lemon juice mixed with water has been shown to effectively increase urinary citrate levels and reduce urinary calcium levels, both of which will reduce stone risk  Orange juice will similarly increase urinary citrate levels  However, orange juice appears to also increase urinary oxalate levels ( a stone promoter)  Other sources of citrate, including grapefruit juice, have had less research completed confirming their beneficial effects on urinary citrate levels  Therefore, lemon juice is typically favored over the other citrus juices as a natural method to increase urinary citrate levels  Many patients find drinking citrus juices to be an attractive alternative to pharmaceutical treatment with potassium citrate      We recommend that stone formers consider supplementing their daily fluid intake with a mixture of 60 ml of concentrated lemon juice in one liter of water to increase their urinary citrate levels  Salt    A high sodium intake increases the risk of stone formation by increasing calcium levels and decreasing citrate (a stone inhibitor) levels in urine  Additionally, high sodium intake will impair the ability of medications such as hydrochlorothiazide to effectively reduce calcium levels in urine  A study of stone formers who were kept on a strict diet with a maximum daily sodium intake of 50 mmol (1200 mg) in addition to a reduced protein diet demonstrated that the low sodium diet was effective in reducing stone recurrence by 50% as compared to the low calcium diet  We recommend that stone formers aim to follow the FDA's guideline of limiting salt intake to 2300 mg of sodium a day in the general population and 1500 mg of sodium a day in those with hypertension,  Americans, or middle aged and older adults  2300 mg is equivalent to about 1 teaspoon of table salt  The best way to determine the salt content of your food is to read the nutrition label  Processed foods tend to contain higher amounts of salt  Choose low sodium options whenever possible  · 1 cup of canned chicken noodle soup contains 870 mg of sodium  · A fried chicken drumstick contains 310 mg of sodium  · A serving of shrimp contains 240 mg of sodium  · 2 slices of white bread contains 200 mg of sodium  · 15 potato chips contain 180 mg of sodium  · 1 container of strawberry yogurt contains 85 mg of sodium  · 1 tomato contains 20 mg of sodium  · 1 apple contains 0 mg of sodium  In addition to lowering the risk of stones, a low sodium intake helps to control or prevent high blood pressure, which can lead to heart disease, stroke, heart failure, and kidney disease  Animal Protein    Animal protein in meat products increases the risk of stone by increasing calcium, oxalate, and uric acid levels in urine   All three of these changes increase the risk of stones  In studies comparing high meat eaters versus low meat eaters, high meat eaters were found to be at increased risk of forming stones  A randomized study of stone formers restricted to a low meat intake of 52 grams a day (equivalent to 8 oz of beef) in combination with sodium restriction found that the combination reduced stone recurrence by 50% compared to calcium restriction alone (Radhames et al, [facility] 2002)  We recommend that most stone formers try to reduce their meat intake to 6 oz a day  This includes all types of meat: beef, pork, poultry, and seafood  The USDA has recommended a daily allowance of 5-6 oz of protein intake among adults  They also recommend choosing non-meat protein foods such as nuts and beans instead of meat sources  Protein from non-meat sources does not appear to increase the risk of stones  · A small steak contains about 3-4 oz of protein  · A quarter pound hamburger with cheese contains 4 oz of protein  · A chicken breast contains about 5 oz of protein, a chicken thigh about 2 5 oz, a chicken drumstick about 1 5 oz  · One 5 oz can of tuna contains 5 oz of protein  · 1 medium egg contains 1 oz of protein  Lowering your animal protein intake and eating more fruits and vegetables also benefits your overall health by limiting the amount of saturated fats and cholesterol in your diet  This helps to reduce your risk of cardiovascular disease  Oxalate    While oxalate plays an important role in the development of calcium oxalate stones, dietary restriction does not appear to be effective in reducing the risk of stones in the majority of patients  About 40% of urinary oxalate comes from dietary sources while the remainder is naturally made within the liver  Therefore, reducing oxalate dietary intake does not always have a significant impact on total urinary oxalate levels      Oxalate is found in many vegetable and fruits, including many healthy dietary choices often making it difficult to achieve a low oxalate diet  Because of these issues, oxalate avoidance is beneficial primarily in those individuals with specific abnormalities that lead to high oxalate urinary levels  We recommend that most stone formers should maintain a normal oxalate intake without the need for oxalate restriction  High oxalate intake should be avoided in individuals found to have high urinary oxalate levels on metabolic evaluation  Oxalate restriction may be beneficial in certain individuals with high urinary oxalate levels  Oxalate Rich Foods    Tea (black)  Spinach  Mustard Greens  Chard  Beets  Rhubarb  Okra  Berries  Chocolate  Nuts  Sweet Potatoes        Calcium    Kidney Stone formers often question whether to stop or reduce their calcium intake  Despite the fact that calcium is a major component of 75% of stones, excessive calcium intake is vary rarely the cause of stone formation  In fact, several studies have shown that restricting calcium intake in most stone formers actually increases the number of stones they develop  This appears to happen because when less calcium is ingested, it becomes easier for oxalate (which normally binds with calcium in the gut) to be absorbed  Higher levels of oxalate in the urine then lead to an increase in stone risk  Calcium obtained from dietary sources appears to be better than calcium from supplements in regards to lowering stone risk because supplements can actually increase your risk of stones slightly (by 17%) while dietary calcium intake is instead associated with lower stone risk  If you need to take supplements, taking them during meals appear to be better in terms of stone risk  We recommend that stone formers maintain a normal calcium intake, preferably from dietary sources   Female stone formers taking calcium supplementation to prevent osteoporosis experience a slightly increased risk of stones (17%) which needs to be balanced against their risk of osteoporosis  Exercise to Lose Weight     Exercise and a healthy diet may help you lose weight  Your doctor may suggest specific exercises  EXERCISE IDEAS AND TIPS      Choose low-cost things you enjoy doing, such as walking, bicycling, or exercising to workout videos   Take stairs instead of the elevator   Walk during your lunch break   Park your car further away from work or school   Go to a gym or an exercise class   Start with 5 to 10 minutes of exercise each day  Build up to 30 minutes of exercise 4 to 6 days a week   Wear shoes with good support and comfortable clothes   Stretch before and after working out   Work out until you breathe harder and your heart beats faster   Drink extra water when you exercise   Do not do so much that you hurt yourself, feel dizzy, or get very short of breath  Exercises that burn about 150 calories:    Running 1 ½ miles in 15 minutes   Playing volleyball for 45 to 60 minutes   Washing and waxing a car for 45 to 60 minutes   Playing touch football for 45 minutes   Walking 1 ¾ miles in 35 minutes   Pushing a stroller 1 ½ miles in 30 minutes   Playing basketball for 30 minutes   Raking leaves for 30 minutes   Bicycling 5 miles in 30 minutes   Walking 2 miles in 30 minutes   Dancing for 30 minutes   Shoveling snow for 15 minutes   Swimming laps for 20 minutes   Walking up stairs for 15 minutes   Bicycling 4 miles in 15 minutes   Gardening for 30 to 45 minutes   Jumping rope for 15 minutes   Washing windows or floors for 45 to 60 minutes

## 2021-07-16 NOTE — PROGRESS NOTES
Nephrology Follow up Consultation  Paula Sanford 54 y o  male MRN: [id number]            BACKGROUND:  Paula Sanford is a 54 y o male who was referred by PEDRO Hurtado for evaluation of Follow-up    ASSESSMENT / PLAN:   54 y o   male with pmh of multiple co-morbidities including diabetes (x6yrs), HTN (x15yrs), depression, nephrolithiasis, vitamin-D deficiency, obesity, hyperlipidemia presents to the office for routine follow-up  CKD stage IA3:  - After review of records in [facility] as well as Care everywhere patient has a baseline creatinine of 0 9-1 2 mg/dL dating as far back as 2013  Most recent labs show a Creatinine of 0 94 mg/dL on 6/23/21  Renal function remains stable  -  Likely has underlying CKD secondary to diabetic kidney disease plus obesity related hyper filtration plus age-related nephron loss plus NSAID use  - SPEP UPEP free light chain ratio from 04/20/2021 within normal limits  -  CT abdomen pelvis from January 2020 no hydronephrosis calcification in the upper pole of the right kidney 3 mm no renal masses  - Acid base and lytes stable  - Clinically the patient appears to be euvolemic   - Recommend to avoid use of NSAIDs, nephrotoxins  Caution advised with regards to exposure to IV contrast dye    - Discussed with the patient in depth  his renal status, including the possible etiologies for CKD  - Advised the patient that when  his GFR is close to 20mL/min then will start discussing about RRT(renal replacement therapy) options such as renal transplant, peritoneal dialysis and hemodialysis  - Informed the patient about the various options for Renal Replacement therapy  - Discussed with the patient how we need to work together to delay the progression of CKD with optimal BP control based on their age and co-morbidities, optimal BS control with HbA1c of <7% and trying to reduce proteinuria by the use of anti-proteinuric agents       Resistant Hypertension:  - Patient is on Lasix 40 mg p o  q day, hydralazine 50 mg p o  b i d  Procardia XL 60 mg p o  q h s , lisinopril 40 mg p o  q day  - blood pressure much better controlled and stable advised patient if he continues to lose weight and if his blood pressure continues to drop then he should call me so we can decrease some of his antihypertensive medications  -  Advised patient appropriate techniques of checking blood   Pressures  - workup for secondary hypertension would remain, aldosterone, plasma metanephrines within normal limits  - Likely some contribution secondary to QUYEN  - Referral placed to Pulmonary for sleep study evaluation last visit will have staff try to reschedule the appointment  - If blood pressure continues to be elevated despite above measures then may consider addition of Aldactone  - Goal BP of <  140/90 based on age and comorbidities  - Instructed to follow low sodium (2gm)diet   - Advised to hold ACEI/ARBs if patient suffers from dehydration due to gastrointestinal losses due to risk of LARS secondary to failure to autoregulate  Hemoglobin:  - Goal Hb of 10-12 g/dL  - Most recent labs suggestive of 14 grams/deciliter    -  No role for IV iron at this time    CKD-MBD(Mineral Bone Disease) /vitamin-D deficiency:  - Based on patients CKD stage following is the goal of therapy    - Maintain calcium phosphorus product of < 55   - Patient is currently not at goal   - currently on ergocalciferol 37478 units p o  Q weekly advised patient to take additional 2000 units p o  q day over-the-counter   - Patients' most recent vitamin D level is  26 6 as of 06/23/2021 and intact PTH of 79 8 as of 04/28/2021  -  Check vitamin-D level prior to next visit    Proteinuria:  - proteinuria most likely secondary to diabetic kidney disease plus obesity related hyper filtration  - most recent microalbumin to creatinine ratio of 306 mg as of March 2021  - most recent protein creatinine ratio 550 mg as of 06/23/2021   - check protein creatinine ratio  - currently on therapy for proteinuria with  Lisinopril, vitamin-D, Lipitor, Procardia XL  Lipids:  -   On Lipitor  - goal LDL less than 70  - Management as per PCP    DM:  - management as per Primary team  - on insulin and metformin  - Advised patient when and if his renal parameters continued to deteriorate he will need to be taken off of metformin due to low GFR   - Advise of tight  Glycemic control in order to delay CKD progression  - Most recent A1c of 8 3% as of 06/30/2021 elevated from prior  - No evidence of diabetic retinopathy as of January 2020 eye exam      Nephrolithiasis:  - Discussed with the patient that the most common types of kidney stones are calcium oxalate stones  - Advised to follow a diet with increased fluid intake so that urine output is greater than 2-2 5L/day  - Advised patient to decrease salt, protein and oxalate intake  -  Will hold off on further workup at this time  - handouts provided    Nutrition /obesity:  - Encouraged patient to follow a renal diet comprising of moderate potassium, low phosphorus and protein restriction to 0 8gm/kg  advised about  dietary habits and weight loss  - Will check serum albumin with next blood work  Followup:  - Patient is to follow-up in 12 months, with lab work to be performed in a few days prior to the next visit  Advised patient to call me in 10 days to review the results if they do not hear back from me, as I may have not received the results  Ralph Warner MD, [facility], 7/16/2021, 9:22 AM             SUBJECTIVE: 54 y o  male presents to the office for routine follow-up  Not seen by Pulmonary since last visit, unfortunately he was not contacted, will try to have that taken care of today and schedule his appointment  Overall significant other says he has snoring slightly less as compared to previously  He has lost some weight and is eating better since last visit    He had run out of his insulin and subsequently his A1c was elevated and now he is back on his insulin  No further NSAID use  No issues with edema  Home SBP has been running around 130s  Thankful for all the care information that he has gotten today  No recent hospitalizations  Review of Systems   Constitutional: Negative for chills, fatigue and fever  HENT: Negative for congestion, postnasal drip, rhinorrhea and sore throat  Respiratory: Negative for cough, shortness of breath and wheezing  Cardiovascular: Negative for leg swelling  Gastrointestinal: Negative for abdominal pain, constipation, diarrhea, nausea and vomiting  Genitourinary: Negative for difficulty urinating, dysuria and hematuria  Musculoskeletal: Negative for back pain  Neurological: Negative for dizziness, light-headedness and headaches  Psychiatric/Behavioral: Negative for agitation and confusion  All other systems reviewed and are negative        PAST MEDICAL HISTORY:  Past Medical History:   Diagnosis Date    Depression     last assessed - 22Oct2012    Diabetes mellitus ([facility] [address] [facility]     Hepatomegaly     Hypertension     Kidney stone     Obesity     Splenomegaly     Syncope     last assessed - [id number]    Vertigo     last assessed - [id number]       PROBLEM LIST    Patient Active Problem List   Diagnosis    Hypertension    Allergic rhinitis    Anxiety    Candidal balanitis    Neck pain    Reduced libido    Depression    Dyslipidemia    Fatigue    Microalbuminuria    Multiple joint pain    Class 1 obesity due to excess calories with serious comorbidity and body mass index (BMI) of 34 0 to 34 9 in adult    Plantar fasciitis    Snoring    Tinea pedis    Trigger thumb of right hand    Vitamin D deficiency    Acute otitis media    Type 2 diabetes mellitus without complication, with long-term current use of insulin (HCC)    Acute bacterial sinusitis    Metformin adverse reaction    Other hemorrhoids    Episode of recurrent major depressive disorder ([facility] )    Mixed hyperlipidemia    Essential hypertension    Screening for cholesterol level    Exposure to COVID-19 virus    Nausea    Diarrhea    Generalized body aches    Resistant hypertension    Albuminuria    Stage 1 chronic kidney disease    Severe episode of recurrent major depressive disorder, without psychotic features ([facility] )    Encounter for hepatitis C screening test for low risk patient    Chronic kidney disease    Nephrolithiasis       PAST SURGICAL HISTORY:  Past Surgical History:   Procedure Laterality Date    NEUROPLASTY / TRANSPOSITION MEDIAN NERVE AT CARPAL TUNNEL      TONSILLECTOMY         SOCIAL HISTORY :   reports that he has never smoked  He uses smokeless tobacco  He reports current alcohol use of about 8 0 standard drinks of alcohol per week  He reports that he does not use drugs  FAMILY HISTORY:  Family History   Problem Relation Age of Onset    Hypertension Mother         Benign Essential HTN    Heart disease Mother     Sleep apnea Mother     Diabetes Father     Heart disease Father     Hypertension Father         Benign Essential HTN       ALLERGIES:  No Known Allergies        PHYSICAL EXAM:  Vitals:    07/16/21 0854 07/16/21 0913   BP: 118/76 120/82   BP Location: Left arm    Patient Position: Sitting    Cuff Size: Large    Pulse: 81    Weight: 107 kg (236 lb 3 2 oz)    Height: 5' 9" (1 753 m)      Body mass index is 34 88 kg/m²  Physical Exam  Vitals reviewed  Constitutional:       General: He is not in acute distress  Appearance: Normal appearance  He is obese  He is not ill-appearing, toxic-appearing or diaphoretic  HENT:      Head: Normocephalic and atraumatic  Mouth/Throat:      Mouth: Mucous membranes are moist       Pharynx: Oropharynx is clear  No oropharyngeal exudate  Eyes:      General: No scleral icterus  Cardiovascular:      Rate and Rhythm: Normal rate  Heart sounds: Normal heart sounds  No friction rub  Pulmonary:      Effort: Pulmonary effort is normal  No respiratory distress  Breath sounds: Normal breath sounds  No wheezing  Abdominal:      General: There is no distension  Palpations: Abdomen is soft  There is no mass  Tenderness: There is no abdominal tenderness  There is no right CVA tenderness or left CVA tenderness  Musculoskeletal:         General: No swelling  Cervical back: Normal range of motion and neck supple  Skin:     Coloration: Skin is not jaundiced  Neurological:      General: No focal deficit present  Mental Status: He is alert and oriented to person, place, and time     Psychiatric:         Mood and Affect: Mood normal          Behavior: Behavior normal          LABORATORY DATA:     Results from last 6 Months   Lab Units 06/23/21  0955 03/26/21  0740   WBC Thousand/uL  --  7 89   HEMOGLOBIN g/dL  --  14 0   HEMATOCRIT %  --  41 0   PLATELETS Thousands/uL  --  257   POTASSIUM mmol/L 3 6  --    CHLORIDE mmol/L 106  --    CO2 mmol/L 24  --    BUN mg/dL 13  --    CREATININE mg/dL 0 94  --    CALCIUM mg/dL 9 3  --    MAGNESIUM mg/dL 2 1  --    PHOSPHORUS mg/dL 2 6*  --         rest all reviewed    RADIOLOGY:  No orders to display     Rest all reviewed        MEDICATIONS:    Current Outpatient Medications:     Accu-Chek FastClix Lancets MISC, by Does not apply route 2 (two) times a day, Disp: 60 each, Rfl: 5    ACCU-CHEK GUIDE test strip, USE 1 STRIP TWICE A DAY , Disp: 60 each, Rfl: 5    atorvastatin (LIPITOR) 40 mg tablet, Take 1 tablet (40 mg total) by mouth daily, Disp: 90 tablet, Rfl: 1    cyanocobalamin ([employer]S VITAMIN B-12) 1000 MCG tablet, Take 1,000 mcg by mouth, Disp: , Rfl:     ergocalciferol (VITAMIN D2) 50,000 units, Take 1 capsule (50,000 Units total) by mouth once a week, Disp: 4 capsule, Rfl: 5    furosemide (LASIX) 40 mg tablet, Take 1 tablet (40 mg total) by mouth daily, Disp: 30 tablet, Rfl: 1    hydrALAZINE (APRESOLINE) 50 mg tablet, Take 1 tablet (50 mg total) by mouth 2 (two) times a day, Disp: 60 tablet, Rfl: 1    insulin glargine (Lantus SoloStar) 100 units/mL injection pen, Inject 40 Units under the skin daily at bedtime, Disp: 12 mL, Rfl: 3    Insulin Pen Needle (B-D UF III MINI PEN NEEDLES) 31G X 5 MM MISC, Inject under the skin daily at bedtime, Disp: 100 each, Rfl: 3    lisinopril (ZESTRIL) 40 mg tablet, Take 1 tablet (40 mg total) by mouth daily, Disp: 90 tablet, Rfl: 1    loperamide (IMODIUM) 2 mg capsule, loperamide 2 mg capsule  TAKE 1 CAPSULE BY MOUTH FOUR TIMES A DAY AS NEEDED FOR DIARRHEA, Disp: , Rfl:     metFORMIN (GLUCOPHAGE) 1000 MG tablet, Take 1 tablet (1,000 mg total) by mouth 2 (two) times a day with meals, Disp: 60 tablet, Rfl: 3    NIFEdipine (PROCARDIA XL) 60 mg 24 hr tablet, Take 1 tablet (60 mg total) by mouth daily at bedtime, Disp: 30 tablet, Rfl: 1    sertraline (ZOLOFT) 100 mg tablet, take 1 tablet by mouth ONCE DAILY, Disp: 90 tablet, Rfl: 1    cyclobenzaprine (FLEXERIL) 10 mg tablet, cyclobenzaprine 10 mg tablet  take 1 tablet by mouth DAILY at bedtime (Patient not taking: Reported on 7/16/2021), Disp: , Rfl:           Portions of the record may have been created with voice recognition software  Occasional wrong word or "sound a like" substitutions may have occurred due to the inherent limitations of voice recognition software  Read the chart carefully and recognize, using context, where substitutions have occurred  If you have any questions, please contact the dictating provider

## 2021-07-21 DIAGNOSIS — I10 HYPERTENSION, UNSPECIFIED TYPE: ICD-10-CM

## 2021-07-21 DIAGNOSIS — E55.9 VITAMIN D DEFICIENCY: ICD-10-CM

## 2021-07-21 PROCEDURE — 4010F ACE/ARB THERAPY RXD/TAKEN: CPT | Performed by: INTERNAL MEDICINE

## 2021-07-21 RX ORDER — ERGOCALCIFEROL 1.25 MG/1
CAPSULE ORAL
Qty: 4 CAPSULE | Refills: 5 | Status: SHIPPED | OUTPATIENT
Start: 2021-07-21

## 2021-07-21 RX ORDER — LISINOPRIL 40 MG/1
TABLET ORAL
Qty: 30 TABLET | Refills: 5 | Status: SHIPPED | OUTPATIENT
Start: 2021-07-21 | End: 2022-04-07 | Stop reason: SDUPTHER

## 2021-07-23 DIAGNOSIS — I10 ESSENTIAL HYPERTENSION: ICD-10-CM

## 2021-07-23 DIAGNOSIS — R80.9 ALBUMINURIA: ICD-10-CM

## 2021-07-23 DIAGNOSIS — N18.2 STAGE 2 CHRONIC KIDNEY DISEASE: ICD-10-CM

## 2021-07-23 DIAGNOSIS — I10 UNCONTROLLED HYPERTENSION: ICD-10-CM

## 2021-07-23 DIAGNOSIS — E66.01 CLASS 2 SEVERE OBESITY DUE TO EXCESS CALORIES WITH SERIOUS COMORBIDITY AND BODY MASS INDEX (BMI) OF 36.0 TO 36.9 IN ADULT (HCC): ICD-10-CM

## 2021-07-23 PROCEDURE — 3066F NEPHROPATHY DOC TX: CPT | Performed by: INTERNAL MEDICINE

## 2021-07-23 RX ORDER — FUROSEMIDE 40 MG/1
40 TABLET ORAL DAILY
Qty: 90 TABLET | Refills: 3 | Status: SHIPPED | OUTPATIENT
Start: 2021-07-23 | End: 2021-11-05 | Stop reason: SDUPTHER

## 2021-08-05 DIAGNOSIS — N18.2 STAGE 2 CHRONIC KIDNEY DISEASE: ICD-10-CM

## 2021-08-05 DIAGNOSIS — I10 UNCONTROLLED HYPERTENSION: ICD-10-CM

## 2021-08-05 DIAGNOSIS — I10 ESSENTIAL HYPERTENSION: ICD-10-CM

## 2021-08-05 DIAGNOSIS — E66.01 CLASS 2 SEVERE OBESITY DUE TO EXCESS CALORIES WITH SERIOUS COMORBIDITY AND BODY MASS INDEX (BMI) OF 36.0 TO 36.9 IN ADULT (HCC): ICD-10-CM

## 2021-08-05 DIAGNOSIS — R80.9 ALBUMINURIA: ICD-10-CM

## 2021-08-05 RX ORDER — HYDRALAZINE HYDROCHLORIDE 50 MG/1
50 TABLET, FILM COATED ORAL 2 TIMES DAILY
Qty: 180 TABLET | Refills: 4 | Status: SHIPPED | OUTPATIENT
Start: 2021-08-05 | End: 2021-11-05 | Stop reason: SDUPTHER

## 2021-08-17 ENCOUNTER — OFFICE VISIT (OUTPATIENT)
Dept: FAMILY MEDICINE CLINIC | Facility: CLINIC | Age: 51
End: 2021-08-17
Payer: COMMERCIAL

## 2021-08-17 VITALS
HEART RATE: 80 BPM | BODY MASS INDEX: 35.55 KG/M2 | HEIGHT: 69 IN | TEMPERATURE: 98.1 F | DIASTOLIC BLOOD PRESSURE: 90 MMHG | SYSTOLIC BLOOD PRESSURE: 150 MMHG | WEIGHT: 240 LBS | OXYGEN SATURATION: 99 % | RESPIRATION RATE: 18 BRPM

## 2021-08-17 DIAGNOSIS — M25.532 BILATERAL WRIST PAIN: ICD-10-CM

## 2021-08-17 DIAGNOSIS — M54.2 NECK PAIN: ICD-10-CM

## 2021-08-17 DIAGNOSIS — I10 ESSENTIAL HYPERTENSION: ICD-10-CM

## 2021-08-17 DIAGNOSIS — M25.50 ARTHRALGIA, UNSPECIFIED JOINT: Primary | ICD-10-CM

## 2021-08-17 DIAGNOSIS — M25.531 BILATERAL WRIST PAIN: ICD-10-CM

## 2021-08-17 PROCEDURE — 3008F BODY MASS INDEX DOCD: CPT | Performed by: NURSE PRACTITIONER

## 2021-08-17 PROCEDURE — 99214 OFFICE O/P EST MOD 30 MIN: CPT | Performed by: NURSE PRACTITIONER

## 2021-08-17 PROCEDURE — 3077F SYST BP >= 140 MM HG: CPT | Performed by: NURSE PRACTITIONER

## 2021-08-17 PROCEDURE — 1036F TOBACCO NON-USER: CPT | Performed by: NURSE PRACTITIONER

## 2021-08-17 PROCEDURE — 3080F DIAST BP >= 90 MM HG: CPT | Performed by: NURSE PRACTITIONER

## 2021-08-17 RX ORDER — PREDNISONE 20 MG/1
60 TABLET ORAL DAILY
Qty: 15 TABLET | Refills: 0 | Status: SHIPPED | OUTPATIENT
Start: 2021-08-17 | End: 2021-08-22

## 2021-08-17 NOTE — ASSESSMENT & PLAN NOTE
-ordering arthralgia panel as patient with multiple joint pain throughout body  Steroid taper given x 5 days as NSAIDs are contraindicated due to kidney disease  Will f/u with patient once results are in

## 2021-08-17 NOTE — ASSESSMENT & PLAN NOTE
-ongoing for many years per patient  Only takes tylenol with no relief  Today I am ordering a xray of cervical spine  Advised on doing physical therapy  Pt to use steroid taper to help ease his pain  Will f/u with patient in 10/2021

## 2021-08-17 NOTE — ASSESSMENT & PLAN NOTE
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

## 2021-08-17 NOTE — PROGRESS NOTES
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  Pt only takes tylenol and uses wrist brace once in a while  Today we discussed use of wrist brace daily at bedtime  Ordering EMG to check carpal tunnel  Use of NSAIDs is contraindicated due to kidney disease  I am giving him a taper of prednisone 60mg daily x 5 days  We discussed that this can raise BS so he is aware  Pt in agreement to take this medication to help with pain  Will f/u if s/s do not resolve  Arthralgia  -ordering arthralgia panel as patient with multiple joint pain throughout body  Steroid taper given x 5 days as NSAIDs are contraindicated due to kidney disease  Will f/u with patient once results are in  Neck pain  -ongoing for many years per patient  Only takes tylenol with no relief  Today I am ordering a xray of cervical spine  Advised on doing physical therapy  Pt to use steroid taper to help ease his pain  Will f/u with patient in 10/2021  Hypertension  -today BP elevated at 150/90  In previous visits with nephrologist it was well controlled on nifedipine, lisinopril, hydralazine, lasix  Pt is in pain 9/10 today and this can be the cause of his BP elevation  To continue on his current medication regimen  I advised at next visit if continues elevated will consult with nephrologist to see what medications we can adjust for him  Pt in agreement with plan of care  Diagnoses and all orders for this visit:    Arthralgia, unspecified joint  -     DAVID Screen w/ Reflex to Titer/Pattern; Future  -     Lyme Total Antibody Profile with reflex to WB; Future  -     RF Screen w/ Reflex to Titer; Future  -     Sjogren's Antibodies  -     predniSONE 20 mg tablet;  Take 3 tablets (60 mg total) by mouth daily for 5 days    Bilateral wrist pain  - EMG 2 Limb Upper Extremity; Future    Neck pain  -     XR spine cervical 2 or 3 vw injury; Future  -     Ambulatory referral to Physical Therapy; Future    Essential hypertension          Subjective:      Patient ID: David Vallejo is a 54 y o  male  54year old male patient here for neck pain  This neck pain is ongoing for years  Today his pain scale is 9/10  States his body aches completely Today he reports pain throughout his body and joints  This is ongoing for 6 months like this  States he had a case with Shasha Genao due to an injury working as a  for 1 year and after had a work related injury that patient states they told him it was arthritis  States In the past his neck and shoulders used to bother him a lot  He cannot be in the same position for too long  Takes tylenol for pain management  Cannot take NSAIDs per nephrologist  States bilateral wrist and both elbows  Uses a wrist brace sometimes per patient  States it radiates down his wrist and gets numbness  Neck Pain   This is a recurrent problem  The current episode started more than 1 month ago  The problem occurs constantly  The problem has been waxing and waning  The pain is associated with an unknown factor  The pain is present in the midline  The quality of the pain is described as aching  The pain is at a severity of 9/10  The pain is severe  Nothing aggravates the symptoms  The pain is same all the time  Stiffness is present all day  Associated symptoms include numbness and tingling  Pertinent negatives include no chest pain, fever, headaches, paresis, visual change or weakness  He has tried acetaminophen for the symptoms  The treatment provided no relief  Wrist Pain   The pain is present in the left wrist and right wrist  This is a recurrent problem  The current episode started more than 1 month ago  There has been a history of trauma  The problem occurs constantly  The problem has been gradually worsening   The quality of the pain is described as aching  The pain is at a severity of 9/10  The pain is severe  Associated symptoms include numbness and tingling  Pertinent negatives include no fever or limited range of motion  The symptoms are aggravated by activity  He has tried acetaminophen for the symptoms  The treatment provided no relief  Family history does not include gout or rheumatoid arthritis  His past medical history is significant for diabetes and osteoarthritis  There is no history of gout or rheumatoid arthritis  The following portions of the patient's history were reviewed and updated as appropriate: allergies, current medications, past family history, past medical history, past social history, past surgical history and problem list     Review of Systems   Constitutional: Negative  Negative for appetite change, fatigue and fever  HENT: Negative  Negative for congestion, ear pain, postnasal drip, rhinorrhea, sore throat and voice change  Eyes: Negative  Respiratory: Negative  Negative for cough, chest tightness, shortness of breath and wheezing  Cardiovascular: Negative  Negative for chest pain and palpitations  Gastrointestinal: Negative  Negative for abdominal distention  Endocrine: Negative  Genitourinary: Negative  Negative for difficulty urinating  Musculoskeletal: Positive for arthralgias (throughout and bilateral wrists) and neck pain  Negative for gout  Skin: Negative  Negative for color change and rash  Allergic/Immunologic: Negative  Neurological: Positive for tingling and numbness  Negative for dizziness, weakness and headaches  Hematological: Negative  Does not bruise/bleed easily  Psychiatric/Behavioral: Negative            Objective:      /90 (BP Location: Left arm, Patient Position: Sitting, Cuff Size: Standard)   Pulse 80   Temp 98 1 °F (36 7 °C) (Temporal)   Resp 18   Ht 5' 9" (1 753 m)   Wt 109 kg (240 lb)   SpO2 99%   BMI 35 44 kg/m²          Physical Exam  Vitals and nursing note reviewed  Constitutional:       General: He is not in acute distress  Appearance: Normal appearance  He is obese  He is not ill-appearing  HENT:      Head: Normocephalic and atraumatic  Right Ear: External ear normal       Left Ear: External ear normal       Nose: Nose normal  No congestion or rhinorrhea  Mouth/Throat:      Mouth: Mucous membranes are moist       Pharynx: Oropharynx is clear  No oropharyngeal exudate  Eyes:      Extraocular Movements: Extraocular movements intact  Conjunctiva/sclera: Conjunctivae normal       Pupils: Pupils are equal, round, and reactive to light  Cardiovascular:      Rate and Rhythm: Normal rate and regular rhythm  Pulses: Normal pulses  Heart sounds: Normal heart sounds  Pulmonary:      Effort: Pulmonary effort is normal  No respiratory distress  Breath sounds: Normal breath sounds  Musculoskeletal:         General: Tenderness (generalized joint pain) present  Normal range of motion  Right wrist: Tenderness present  Left wrist: Tenderness present  Cervical back: Normal range of motion and neck supple  Pain with movement and muscular tenderness present  Normal range of motion  Skin:     General: Skin is warm and dry  Capillary Refill: Capillary refill takes less than 2 seconds  Neurological:      General: No focal deficit present  Mental Status: He is alert and oriented to person, place, and time     Psychiatric:         Mood and Affect: Mood normal          Behavior: Behavior normal            Chief Complaint   Patient presents with    Neck Pain

## 2021-08-17 NOTE — ASSESSMENT & PLAN NOTE
-today BP elevated at 150/90  In previous visits with nephrologist it was well controlled on nifedipine, lisinopril, hydralazine, lasix  Pt is in pain 9/10 today and this can be the cause of his BP elevation  To continue on his current medication regimen  I advised at next visit if continues elevated will consult with nephrologist to see what medications we can adjust for him  Pt in agreement with plan of care

## 2021-08-23 ENCOUNTER — APPOINTMENT (OUTPATIENT)
Dept: LAB | Facility: CLINIC | Age: 51
End: 2021-08-23
Payer: COMMERCIAL

## 2021-08-23 DIAGNOSIS — M25.50 ARTHRALGIA, UNSPECIFIED JOINT: ICD-10-CM

## 2021-08-23 LAB — RHEUMATOID FACT SER QL LA: NEGATIVE

## 2021-08-23 PROCEDURE — 86618 LYME DISEASE ANTIBODY: CPT

## 2021-08-23 PROCEDURE — 86235 NUCLEAR ANTIGEN ANTIBODY: CPT | Performed by: NURSE PRACTITIONER

## 2021-08-23 PROCEDURE — 86430 RHEUMATOID FACTOR TEST QUAL: CPT

## 2021-08-23 PROCEDURE — 36415 COLL VENOUS BLD VENIPUNCTURE: CPT | Performed by: NURSE PRACTITIONER

## 2021-08-23 PROCEDURE — 86038 ANTINUCLEAR ANTIBODIES: CPT

## 2021-08-24 LAB
ENA SS-A AB SER-ACNC: <0.2 AI (ref 0–0.9)
ENA SS-B AB SER-ACNC: <0.2 AI (ref 0–0.9)

## 2021-08-25 LAB
B BURGDOR IGG+IGM SER-ACNC: 15
RYE IGE QN: NEGATIVE

## 2021-09-15 DIAGNOSIS — R80.9 ALBUMINURIA: ICD-10-CM

## 2021-09-15 DIAGNOSIS — E66.01 CLASS 2 SEVERE OBESITY DUE TO EXCESS CALORIES WITH SERIOUS COMORBIDITY AND BODY MASS INDEX (BMI) OF 36.0 TO 36.9 IN ADULT (HCC): ICD-10-CM

## 2021-09-15 DIAGNOSIS — N18.2 STAGE 2 CHRONIC KIDNEY DISEASE: ICD-10-CM

## 2021-09-15 DIAGNOSIS — I10 ESSENTIAL HYPERTENSION: ICD-10-CM

## 2021-09-15 DIAGNOSIS — I10 UNCONTROLLED HYPERTENSION: ICD-10-CM

## 2021-09-15 PROCEDURE — 3066F NEPHROPATHY DOC TX: CPT | Performed by: NURSE PRACTITIONER

## 2021-09-15 RX ORDER — NIFEDIPINE 60 MG/1
60 TABLET, EXTENDED RELEASE ORAL
Qty: 90 TABLET | Refills: 2 | Status: SHIPPED | OUTPATIENT
Start: 2021-09-15 | End: 2021-11-05 | Stop reason: SDUPTHER

## 2021-09-28 ENCOUNTER — RA CDI HCC (OUTPATIENT)
Dept: OTHER | Facility: HOSPITAL | Age: 51
End: 2021-09-28

## 2021-09-29 PROBLEM — E11.22 TYPE 2 DIABETES MELLITUS WITH CHRONIC KIDNEY DISEASE, WITH LONG-TERM CURRENT USE OF INSULIN (HCC): Status: ACTIVE | Noted: 2018-06-20

## 2021-11-03 DIAGNOSIS — F32.A DEPRESSION, UNSPECIFIED DEPRESSION TYPE: ICD-10-CM

## 2021-11-05 DIAGNOSIS — I10 UNCONTROLLED HYPERTENSION: ICD-10-CM

## 2021-11-05 DIAGNOSIS — R80.9 ALBUMINURIA: ICD-10-CM

## 2021-11-05 DIAGNOSIS — E66.01 CLASS 2 SEVERE OBESITY DUE TO EXCESS CALORIES WITH SERIOUS COMORBIDITY AND BODY MASS INDEX (BMI) OF 36.0 TO 36.9 IN ADULT (HCC): ICD-10-CM

## 2021-11-05 DIAGNOSIS — I10 ESSENTIAL HYPERTENSION: ICD-10-CM

## 2021-11-05 DIAGNOSIS — N18.2 STAGE 2 CHRONIC KIDNEY DISEASE: ICD-10-CM

## 2021-11-07 RX ORDER — NIFEDIPINE 60 MG/1
60 TABLET, EXTENDED RELEASE ORAL
Qty: 90 TABLET | Refills: 2 | Status: SHIPPED | OUTPATIENT
Start: 2021-11-07

## 2021-11-07 RX ORDER — HYDRALAZINE HYDROCHLORIDE 50 MG/1
50 TABLET, FILM COATED ORAL 2 TIMES DAILY
Qty: 180 TABLET | Refills: 4 | Status: SHIPPED | OUTPATIENT
Start: 2021-11-07

## 2021-11-07 RX ORDER — FUROSEMIDE 40 MG/1
40 TABLET ORAL DAILY
Qty: 90 TABLET | Refills: 3 | Status: SHIPPED | OUTPATIENT
Start: 2021-11-07

## 2021-11-08 DIAGNOSIS — F32.A DEPRESSION, UNSPECIFIED DEPRESSION TYPE: ICD-10-CM

## 2021-11-08 DIAGNOSIS — E78.2 MIXED HYPERLIPIDEMIA: ICD-10-CM

## 2021-11-08 DIAGNOSIS — E11.65 TYPE 2 DIABETES MELLITUS WITH HYPERGLYCEMIA, WITHOUT LONG-TERM CURRENT USE OF INSULIN (HCC): ICD-10-CM

## 2021-11-09 ENCOUNTER — TELEPHONE (OUTPATIENT)
Dept: NEUROLOGY | Facility: CLINIC | Age: 51
End: 2021-11-09

## 2021-11-09 ENCOUNTER — PROCEDURE VISIT (OUTPATIENT)
Dept: NEUROLOGY | Facility: CLINIC | Age: 51
End: 2021-11-09
Payer: COMMERCIAL

## 2021-11-09 DIAGNOSIS — M25.531 BILATERAL WRIST PAIN: ICD-10-CM

## 2021-11-09 DIAGNOSIS — M25.532 BILATERAL WRIST PAIN: ICD-10-CM

## 2021-11-09 PROCEDURE — 95886 MUSC TEST DONE W/N TEST COMP: CPT | Performed by: PHYSICAL MEDICINE & REHABILITATION

## 2021-11-09 PROCEDURE — 95911 NRV CNDJ TEST 9-10 STUDIES: CPT | Performed by: PHYSICAL MEDICINE & REHABILITATION

## 2021-11-09 RX ORDER — ATORVASTATIN CALCIUM 40 MG/1
40 TABLET, FILM COATED ORAL DAILY
Qty: 90 TABLET | Refills: 3 | Status: SHIPPED | OUTPATIENT
Start: 2021-11-09 | End: 2022-04-07 | Stop reason: SDUPTHER

## 2021-11-09 RX ORDER — SERTRALINE HYDROCHLORIDE 100 MG/1
TABLET, FILM COATED ORAL
Qty: 90 TABLET | Refills: 1 | Status: SHIPPED | OUTPATIENT
Start: 2021-11-09 | End: 2022-04-07

## 2021-11-09 RX ORDER — SERTRALINE HYDROCHLORIDE 100 MG/1
100 TABLET, FILM COATED ORAL DAILY
Qty: 90 TABLET | Refills: 3 | Status: SHIPPED | OUTPATIENT
Start: 2021-11-09

## 2021-11-12 ENCOUNTER — TELEPHONE (OUTPATIENT)
Dept: FAMILY MEDICINE CLINIC | Facility: CLINIC | Age: 51
End: 2021-11-12

## 2021-11-15 DIAGNOSIS — B00.1 COLD SORE: Primary | ICD-10-CM

## 2021-11-15 RX ORDER — ACYCLOVIR 400 MG/1
400 TABLET ORAL 3 TIMES DAILY
Qty: 21 TABLET | Refills: 0 | Status: SHIPPED | OUTPATIENT
Start: 2021-11-15 | End: 2021-11-22

## 2021-11-23 ENCOUNTER — VBI (OUTPATIENT)
Dept: ADMINISTRATIVE | Facility: OTHER | Age: 51
End: 2021-11-23

## 2021-11-29 ENCOUNTER — VBI (OUTPATIENT)
Dept: ADMINISTRATIVE | Facility: OTHER | Age: 51
End: 2021-11-29

## 2022-01-04 ENCOUNTER — APPOINTMENT (OUTPATIENT)
Dept: URGENT CARE | Age: 52
End: 2022-01-04
Payer: OTHER MISCELLANEOUS

## 2022-01-04 ENCOUNTER — HOSPITAL ENCOUNTER (EMERGENCY)
Facility: HOSPITAL | Age: 52
Discharge: HOME | End: 2022-01-05
Attending: EMERGENCY MEDICINE | Admitting: EMERGENCY MEDICINE
Payer: COMMERCIAL

## 2022-01-04 ENCOUNTER — APPOINTMENT (EMERGENCY)
Dept: RADIOLOGY | Facility: HOSPITAL | Age: 52
End: 2022-01-04
Payer: COMMERCIAL

## 2022-01-04 VITALS
DIASTOLIC BLOOD PRESSURE: 117 MMHG | TEMPERATURE: 97.8 F | HEART RATE: 85 BPM | RESPIRATION RATE: 18 BRPM | SYSTOLIC BLOOD PRESSURE: 175 MMHG | OXYGEN SATURATION: 98 %

## 2022-01-04 DIAGNOSIS — S06.0X9A CONCUSSION: Primary | ICD-10-CM

## 2022-01-04 DIAGNOSIS — S09.90XA CLOSED HEAD INJURY, INITIAL ENCOUNTER: ICD-10-CM

## 2022-01-04 PROCEDURE — 70450 CT HEAD/BRAIN W/O DYE: CPT

## 2022-01-04 PROCEDURE — G1004 CDSM NDSC: HCPCS

## 2022-01-04 PROCEDURE — 99283 EMERGENCY DEPT VISIT LOW MDM: CPT | Performed by: PHYSICIAN ASSISTANT

## 2022-01-04 PROCEDURE — G0382 LEV 3 HOSP TYPE B ED VISIT: HCPCS | Performed by: PHYSICIAN ASSISTANT

## 2022-01-04 PROCEDURE — 99284 EMERGENCY DEPT VISIT MOD MDM: CPT | Performed by: EMERGENCY MEDICINE

## 2022-01-04 PROCEDURE — 99284 EMERGENCY DEPT VISIT MOD MDM: CPT

## 2022-01-04 RX ORDER — ACETAMINOPHEN 325 MG/1
975 TABLET ORAL ONCE
Status: COMPLETED | OUTPATIENT
Start: 2022-01-04 | End: 2022-01-04

## 2022-01-04 RX ORDER — ONDANSETRON 4 MG/1
4 TABLET, ORALLY DISINTEGRATING ORAL ONCE
Status: COMPLETED | OUTPATIENT
Start: 2022-01-04 | End: 2022-01-04

## 2022-01-04 RX ORDER — ONDANSETRON 2 MG/ML
4 INJECTION INTRAMUSCULAR; INTRAVENOUS ONCE
Status: DISCONTINUED | OUTPATIENT
Start: 2022-01-04 | End: 2022-01-04

## 2022-01-04 RX ADMIN — ACETAMINOPHEN 975 MG: 325 TABLET, FILM COATED ORAL at 22:55

## 2022-01-04 RX ADMIN — ONDANSETRON 4 MG: 4 TABLET, ORALLY DISINTEGRATING ORAL at 22:56

## 2022-01-05 NOTE — ED PROVIDER NOTES
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 [place] and did not notice that there was a metal beam and ended up hitting his head against it  He reports that he lost consciousness  He states that he believes that he was unconscious for approximately 30 seconds  Reports that since the accident he has vomited twice  Reports that he went to urgent care for evaluation of but was told that he should come to the emergency department  The patient reports having pain to the area of his head that impacted the bean  He denies a laceration  He also denies blurry vision, neck pain/stiffness and localized numbness, tingling or weakness  The patient states that he not take any anti-platelet or anticoagulation medication  Prior to Admission Medications   Prescriptions Last Dose Informant Patient Reported? Taking? ACCU-CHEK GUIDE test strip   No No   Sig: USE 1 STRIP TWICE A DAY     Accu-Chek FastClix Lancets MISC   No No   Sig: by Does not apply route 2 (two) times a day   Insulin Pen Needle (B-D UF III MINI PEN NEEDLES) 31G X 5 MM MISC   No No   Sig: Inject under the skin daily at bedtime   NIFEdipine (PROCARDIA XL) 60 mg 24 hr tablet   No No   Sig: Take 1 tablet (60 mg total) by mouth daily at bedtime   acyclovir (ZOVIRAX) 400 MG tablet   No No   Sig: Take 1 tablet (400 mg total) by mouth 3 (three) times a day for 7 days   atorvastatin (LIPITOR) 40 mg tablet   No No   Sig: Take 1 tablet (40 mg total) by mouth daily   cyanocobalamin ([employer] VITAMIN B-12) 1000 MCG tablet  Self Yes No   Sig: Take 1,000 mcg by mouth   cyclobenzaprine (FLEXERIL) 10 mg tablet   Yes No   Sig: cyclobenzaprine 10 mg tablet   take 1 tablet by mouth DAILY at bedtime   Patient not taking: Reported on 7/16/2021   ergocalciferol (VITAMIN D2) 50,000 units   No No   Sig: TAKE 1 CAPSULE BY MOUTH ONCE A WEEK   furosemide (LASIX) 40 mg tablet   No No   Sig: Take 1 tablet (40 mg total) by mouth daily   hydrALAZINE (APRESOLINE) 50 mg tablet   No No   Sig: Take 1 tablet (50 mg total) by mouth 2 (two) times a day   insulin glargine (Lantus SoloStar) 100 units/mL injection pen   No No   Sig: Inject 40 Units under the skin daily at bedtime   lisinopril (ZESTRIL) 40 mg tablet   No No   Sig: take 1 tablet by mouth once daily   loperamide (IMODIUM) 2 mg capsule   Yes No   Sig: loperamide 2 mg capsule   TAKE 1 CAPSULE BY MOUTH FOUR TIMES A DAY AS NEEDED FOR DIARRHEA   metFORMIN (GLUCOPHAGE) 1000 MG tablet   No No   Sig: Take 1 tablet (1,000 mg total) by mouth 2 (two) times a day with meals   sertraline (ZOLOFT) 100 mg tablet   No No   Sig: take 1 tablet by mouth ONCE DAILY   sertraline (ZOLOFT) 100 mg tablet   No No   Sig: Take 1 tablet (100 mg total) by mouth daily      Facility-Administered Medications: None       Past Medical History:   Diagnosis Date    Depression     last assessed - 22Oct2012    Diabetes mellitus ([facility] [address] )     Hepatomegaly     Hypertension     Kidney stone     Obesity     Splenomegaly     Syncope     last assessed - 22Jul2013    Vertigo     last assessed - 05Aug2013       Past Surgical History:   Procedure Laterality Date    NEUROPLASTY / TRANSPOSITION MEDIAN NERVE AT CARPAL TUNNEL      TONSILLECTOMY         Family History   Problem Relation Age of Onset    Hypertension Mother         Benign Essential HTN    Heart disease Mother     Sleep apnea Mother     Diabetes Father     Heart disease Father     Hypertension Father         Benign Essential HTN     I have reviewed and agree with the history as documented  E-Cigarette/Vaping     E-Cigarette/Vaping Substances     Social History     Tobacco Use    Smoking status: Never Smoker    Smokeless tobacco: Current User    Tobacco comment: Former smoker   Substance Use Topics    Alcohol use: Yes     Alcohol/week: 8 0 standard drinks     Types: 2 Cans of beer, 6 Shots of liquor per week     Comment: social drinker    Drug use: No        Review of Systems   Constitutional: Negative for chills and fever  HENT: Negative for ear pain and sore throat  Eyes: Negative for pain and visual disturbance  Respiratory: Negative for cough and shortness of breath  Cardiovascular: Negative for chest pain and palpitations  Gastrointestinal: Negative for abdominal pain and vomiting  Genitourinary: Negative for dysuria and hematuria  Musculoskeletal: Negative for arthralgias and back pain  Skin: Negative for color change and rash  Neurological: Positive for headaches  Negative for seizures and syncope  All other systems reviewed and are negative  Physical Exam  ED Triage Vitals   Temperature Pulse Respirations Blood Pressure SpO2   01/04/22 2202 01/04/22 2202 01/04/22 2202 01/04/22 2202 01/04/22 2202   97 8 °F (36 6 °C) 85 18 (!) 175/117 98 %      Temp src Heart Rate Source Patient Position - Orthostatic VS BP Location FiO2 (%)   -- 01/04/22 2202 01/04/22 2202 01/04/22 2202 --    Monitor Sitting Left arm       Pain Score       01/04/22 2255       9             Orthostatic Vital Signs  Vitals:    01/04/22 2202   BP: (!) 175/117   Pulse: 85   Patient Position - Orthostatic VS: Sitting       Physical Exam  Vitals and nursing note reviewed  Constitutional:       Appearance: He is well-developed  HENT:      Head: Normocephalic and atraumatic  No raccoon eyes or Quinonez's sign  Eyes:      Conjunctiva/sclera: Conjunctivae normal       Pupils: Pupils are equal, round, and reactive to light  Funduscopic exam:     Right eye: No papilledema  Left eye: No papilledema  Cardiovascular:      Rate and Rhythm: Normal rate and regular rhythm  Heart sounds: No murmur heard  Pulmonary:      Effort: Pulmonary effort is normal  No respiratory distress  Breath sounds: Normal breath sounds  Abdominal:      Palpations: Abdomen is soft  Tenderness: There is no abdominal tenderness  Musculoskeletal:      Cervical back: Normal and neck supple  No spinous process tenderness  Thoracic back: Normal       Lumbar back: Normal    Skin:     General: Skin is warm and dry  Neurological:      Mental Status: He is alert and oriented to person, place, and time  GCS: GCS eye subscore is 4  GCS verbal subscore is 5  GCS motor subscore is 6  Cranial Nerves: Cranial nerves are intact  Sensory: Sensation is intact  Motor: Motor function is intact  Coordination: Coordination is intact  ED Medications  Medications   acetaminophen (TYLENOL) tablet 975 mg (975 mg Oral Given 1/4/22 2255)   ondansetron (ZOFRAN-ODT) dispersible tablet 4 mg (4 mg Oral Given 1/4/22 2256)       Diagnostic Studies  Results Reviewed     None                 CT head without contrast   Final Result by Malathi Soliz MD (01/04 2330)      No acute intracranial abnormality  Workstation performed: [id number]               Procedures  Procedures      ED Course           SBIRT 22yo+      Most Recent Value   SBIRT (24 yo +)    In order to provide better care to our patients, we are screening all of our patients for alcohol and drug use  Would it be okay to ask you these screening questions? Yes Filed at: 01/04/2022 2205   Initial Alcohol Screen: US AUDIT-C     1  How often do you have a drink containing alcohol? 0 Filed at: 01/04/2022 2205   2  How many drinks containing alcohol do you have on a typical day you are drinking? 0 Filed at: 01/04/2022 2205   3a  Male UNDER 65: How often do you have five or more drinks on one occasion?  0 Filed at: 01/04/2022 2205   3b  FEMALE Any Age, or MALE 65+: How often do you have 4 or more drinks on one occassion? 0 Filed at: 01/04/2022 2205   Audit-C Score 0 Filed at: 01/04/2022 2205   JJ: How many times in the past year have you    Used an illegal drug or used a prescription medication for non-medical reasons? Never Filed at: 01/04/2022 2205                MDM  Number of Diagnoses or Management Options  Closed head injury, initial encounter  Concussion  Diagnosis management comments: 22-year-old male presented to the emergency department for evaluation after a head injury  On arrival the patient was awake, alert, oriented and in no acute distress  CT scan of the patient's head with no acute traumatic injuries  The patient was treated with Tylenol and Zofran  On re-evaluation the patient reported some improvement of symptoms  All diagnostic studies were discussed with the patient in detail  He is appropriate for discharge at this time with recommendation to follow up with his PCP for continued symptoms  Return precautions were discussed  Patient agrees with the plan for discharge and feels comfortable to go home with proper f/u  Advised to return for worsening or additional problems  Diagnostic tests were reviewed and questions answered  Diagnosis, care plan and treatment options were discussed  The patient understands instructions and will follow up as directed          Disposition  Final diagnoses:   Concussion   Closed head injury, initial encounter     Time reflects when diagnosis was documented in both MDM as applicable and the Disposition within this note     Time User Action Codes Description Comment    1/4/2022 11:43 PM Nate Ho Add [S06 [id number]] Concussion     1/4/2022 11:43 PM Nate Ho Add [S09 [id number]] Closed head injury, initial encounter       ED Disposition     ED Disposition Condition Date/Time Comment    Discharge Stable Tue Jan 4, 2022 11:43 PM Petra Littlejohn discharge to home/self care              Follow-up Information     Follow up With Specialties Details Why Contact Info Additional [address], CRNP Nurse Practitioner Schedule an appointment as soon as possible for a visit   [address] [facility] [address]  [address]  [phone]       [facility] Emergency Department Emergency Medicine Go to  If symptoms worsen [address]  [address] [facility] [address] Emergency Department, [address] [facility], [place], [place], [facility] [address]          Discharge Medication List as of 1/4/2022 11:44 PM      CONTINUE these medications which have NOT CHANGED    Details   Accu-Chek FastClix Lancets MISC by Does not apply route 2 (two) times a day, Starting Tue 7/7/2020, Normal      ACCU-CHEK GUIDE test strip USE 1 STRIP TWICE A DAY , Normal      acyclovir (ZOVIRAX) 400 MG tablet Take 1 tablet (400 mg total) by mouth 3 (three) times a day for 7 days, Starting Mon 11/15/2021, Until Mon 11/22/2021, Normal      atorvastatin (LIPITOR) 40 mg tablet Take 1 tablet (40 mg total) by mouth daily, Starting Tue 11/9/2021, Normal      cyanocobalamin ([employer] VITAMIN B-12) 1000 MCG tablet Take 1,000 mcg by mouth, Historical Med      cyclobenzaprine (FLEXERIL) 10 mg tablet cyclobenzaprine 10 mg tablet   take 1 tablet by mouth DAILY at bedtime, Historical Med      ergocalciferol (VITAMIN D2) 50,000 units TAKE 1 CAPSULE BY MOUTH ONCE A WEEK, Normal      furosemide (LASIX) 40 mg tablet Take 1 tablet (40 mg total) by mouth daily, Starting Sun 11/7/2021, Normal      hydrALAZINE (APRESOLINE) 50 mg tablet Take 1 tablet (50 mg total) by mouth 2 (two) times a day, Starting Sun 11/7/2021, Normal      insulin glargine (Lantus SoloStar) 100 units/mL injection pen Inject 40 Units under the skin daily at bedtime, Starting Tue 6/29/2021, Normal      Insulin Pen Needle (B-D UF III MINI PEN NEEDLES) 31G X 5 MM MISC Inject under the skin daily at bedtime, Starting Tue 7/7/2020, Normal      lisinopril (ZESTRIL) 40 mg tablet take 1 tablet by mouth once daily, Normal      loperamide (IMODIUM) 2 mg capsule loperamide 2 mg capsule   TAKE 1 CAPSULE BY MOUTH FOUR TIMES A DAY AS NEEDED FOR DIARRHEA, Historical Med      metFORMIN (GLUCOPHAGE) 1000 MG tablet Take 1 tablet (1,000 mg total) by mouth 2 (two) times a day with meals, Starting Tue 11/9/2021, Normal      NIFEdipine (PROCARDIA XL) 60 mg 24 hr tablet Take 1 tablet (60 mg total) by mouth daily at bedtime, Starting Sun 11/7/2021, Normal      !! sertraline (ZOLOFT) 100 mg tablet take 1 tablet by mouth ONCE DAILY, Normal      !! sertraline (ZOLOFT) 100 mg tablet Take 1 tablet (100 mg total) by mouth daily, Starting Tue 11/9/2021, Normal       !! - Potential duplicate medications found  Please discuss with provider  No discharge procedures on file  PDMP Review     None           ED Provider  Attending physically available and evaluated Jennifer Hoff  ARYA managed the patient along with the ED Attending      Electronically Signed by         Subha Alonzo MD  01/05/22 2464

## 2022-01-07 ENCOUNTER — APPOINTMENT (OUTPATIENT)
Dept: URGENT CARE | Age: 52
End: 2022-01-07
Payer: OTHER MISCELLANEOUS

## 2022-01-07 PROCEDURE — 99213 OFFICE O/P EST LOW 20 MIN: CPT | Performed by: PREVENTIVE MEDICINE

## 2022-01-09 NOTE — ED ATTENDING ATTESTATION
1/4/2022  IPedro MD, saw and evaluated the patient  I have discussed the patient with the resident/non-physician practitioner and agree with the resident's/non-physician practitioner's findings, Plan of Care, and MDM as documented in the resident's/non-physician practitioner's note, except where noted  All available labs and Radiology studies were reviewed  I was present for key portions of any procedure(s) performed by the resident/non-physician practitioner and I was immediately available to provide assistance  At this point I agree with the current assessment done in the Emergency Department  I have conducted an independent evaluation of this patient a history and physical is as follows:    ED Course     Patient is a 53-year-old male who presents from urgent care after head injury with loss of consciousness  Patient states he struck his head against mental female work  Had a brief period of loss of consciousness admits to 2 episodes of nausea vomiting with recurrent headache and light sensitivity  Vital signs reviewed  Head normocephalic atraumatic TMs clear bilaterally oropharynx clear  Neck is supple no meningismus no C-spine tenderness to palpation  Chest abdomen pelvis atraumatic lungs clear to auscultation bilaterally heart regular rate rhythm without murmurs  Abdomen soft nontender nondistended normal bowel sounds  There is no thoracic or lumbar spine tenderness on palpation  Extremities atraumatic no injuries identified limbs warm well perfused neurovascular intact no evidence of deformity  Impression closed head injury with loss of consciousness  Patient will undergo CT imaging to exclude clinically significant TBI  Pain control anticipate discharge with PCP/occupational health follow-up        Critical Care Time  Procedures

## 2022-01-14 ENCOUNTER — APPOINTMENT (OUTPATIENT)
Dept: URGENT CARE | Age: 52
End: 2022-01-14
Payer: OTHER MISCELLANEOUS

## 2022-01-14 PROCEDURE — 99213 OFFICE O/P EST LOW 20 MIN: CPT | Performed by: PREVENTIVE MEDICINE

## 2022-03-09 ENCOUNTER — RA CDI HCC (OUTPATIENT)
Dept: OTHER | Facility: HOSPITAL | Age: 52
End: 2022-03-09

## 2022-03-09 NOTE — PROGRESS NOTES
[facility]  coding opportunities       Chart reviewed, no opportunity found: CHART REVIEWED, NO OPPORTUNITY FOUND                        Patients insurance company: [employer] (Medicare Advantage and Commercial)

## 2022-04-07 ENCOUNTER — OFFICE VISIT (OUTPATIENT)
Dept: FAMILY MEDICINE CLINIC | Facility: CLINIC | Age: 52
End: 2022-04-07
Payer: COMMERCIAL

## 2022-04-07 VITALS
DIASTOLIC BLOOD PRESSURE: 80 MMHG | OXYGEN SATURATION: 99 % | WEIGHT: 240 LBS | SYSTOLIC BLOOD PRESSURE: 122 MMHG | HEART RATE: 75 BPM | HEIGHT: 69 IN | RESPIRATION RATE: 18 BRPM | TEMPERATURE: 97.8 F | BODY MASS INDEX: 35.55 KG/M2

## 2022-04-07 DIAGNOSIS — I10 ESSENTIAL HYPERTENSION: ICD-10-CM

## 2022-04-07 DIAGNOSIS — E66.01 CLASS 2 SEVERE OBESITY DUE TO EXCESS CALORIES WITH SERIOUS COMORBIDITY AND BODY MASS INDEX (BMI) OF 36.0 TO 36.9 IN ADULT (HCC): ICD-10-CM

## 2022-04-07 DIAGNOSIS — E78.2 MIXED HYPERLIPIDEMIA: ICD-10-CM

## 2022-04-07 DIAGNOSIS — R53.83 OTHER FATIGUE: ICD-10-CM

## 2022-04-07 DIAGNOSIS — E11.65 TYPE 2 DIABETES MELLITUS WITH HYPERGLYCEMIA, WITHOUT LONG-TERM CURRENT USE OF INSULIN (HCC): ICD-10-CM

## 2022-04-07 DIAGNOSIS — M54.2 NECK PAIN: ICD-10-CM

## 2022-04-07 DIAGNOSIS — Z12.11 SCREENING FOR COLON CANCER: ICD-10-CM

## 2022-04-07 DIAGNOSIS — R73.9 HYPERGLYCEMIA: ICD-10-CM

## 2022-04-07 DIAGNOSIS — Z00.00 ANNUAL PHYSICAL EXAM: Primary | ICD-10-CM

## 2022-04-07 PROBLEM — E66.812 CLASS 2 SEVERE OBESITY DUE TO EXCESS CALORIES WITH SERIOUS COMORBIDITY AND BODY MASS INDEX (BMI) OF 36.0 TO 36.9 IN ADULT (HCC): Status: ACTIVE | Noted: 2022-04-07

## 2022-04-07 LAB — SL AMB POCT HEMOGLOBIN AIC: 9.2 (ref ?–6.5)

## 2022-04-07 PROCEDURE — 99396 PREV VISIT EST AGE 40-64: CPT | Performed by: NURSE PRACTITIONER

## 2022-04-07 PROCEDURE — 3079F DIAST BP 80-89 MM HG: CPT | Performed by: NURSE PRACTITIONER

## 2022-04-07 PROCEDURE — 1036F TOBACCO NON-USER: CPT | Performed by: NURSE PRACTITIONER

## 2022-04-07 PROCEDURE — 3074F SYST BP LT 130 MM HG: CPT | Performed by: NURSE PRACTITIONER

## 2022-04-07 PROCEDURE — 3008F BODY MASS INDEX DOCD: CPT | Performed by: NURSE PRACTITIONER

## 2022-04-07 PROCEDURE — 4010F ACE/ARB THERAPY RXD/TAKEN: CPT | Performed by: NURSE PRACTITIONER

## 2022-04-07 PROCEDURE — 3046F HEMOGLOBIN A1C LEVEL >9.0%: CPT | Performed by: NURSE PRACTITIONER

## 2022-04-07 PROCEDURE — 83036 HEMOGLOBIN GLYCOSYLATED A1C: CPT | Performed by: NURSE PRACTITIONER

## 2022-04-07 RX ORDER — IBUPROFEN 800 MG/1
800 TABLET ORAL EVERY 8 HOURS PRN
Qty: 30 TABLET | Refills: 0 | Status: SHIPPED | OUTPATIENT
Start: 2022-04-07

## 2022-04-07 RX ORDER — LISINOPRIL 40 MG/1
40 TABLET ORAL DAILY
Qty: 90 TABLET | Refills: 1 | Status: SHIPPED | OUTPATIENT
Start: 2022-04-07 | End: 2022-04-07

## 2022-04-07 RX ORDER — LISINOPRIL 40 MG/1
40 TABLET ORAL DAILY
Qty: 90 TABLET | Refills: 1 | Status: SHIPPED | OUTPATIENT
Start: 2022-04-07

## 2022-04-07 RX ORDER — ATORVASTATIN CALCIUM 40 MG/1
40 TABLET, FILM COATED ORAL DAILY
Qty: 90 TABLET | Refills: 3 | Status: SHIPPED | OUTPATIENT
Start: 2022-04-07

## 2022-04-07 NOTE — ASSESSMENT & PLAN NOTE
-A1c increase from 8 3% to 9 2%  Pt is currently on Metfomrin 1000mg BID and Lantus at bedtime  I am adding OZempic injections to start with 0 25mg weekly x 1 month then 0 5mg weekly for rest of 60 days  Pt to modify his diet and exercise  Avoidance of sweets and carbs  To f/u in 3 months for recheck     Lab Results   Component Value Date    HGBA1C 9 2 (A) 04/07/2022

## 2022-04-07 NOTE — PATIENT INSTRUCTIONS

## 2022-04-07 NOTE — ASSESSMENT & PLAN NOTE
-BP today with good control  To continue on current medication regimen  Currently on Nifedipine, Lisinopril, Lasix and Hydralazine  He follows with nephrologist  Will continue on current regimen no changes

## 2022-04-07 NOTE — PROGRESS NOTES
ADULT ANNUAL PHYSICAL   [facility] PRIMARY CARE [facility]    NAME: Pearl Rosales  AGE: 50 y o  SEX: male  : 1970     DATE: 2022     Assessment and Plan:     Problem List Items Addressed This Visit        Endocrine    Type 2 diabetes mellitus with hyperglycemia, without long-term current use of insulin (HCC)     -A1c increase from 8 3% to 9 2%  Pt is currently on Metfomrin 1000mg BID and Lantus at bedtime  I am adding OZempic injections to start with 0 25mg weekly x 1 month then 0 5mg weekly for rest of 60 days  Pt to modify his diet and exercise  Avoidance of sweets and carbs  To f/u in 3 months for recheck  Lab Results   Component Value Date    HGBA1C 9 2 (A) 2022            Relevant Medications    Semaglutide,0 25 or 0 5MG/DOS, 2 MG/1 5ML SOPN    Other Relevant Orders    IRIS Diabetic eye exam    POCT hemoglobin A1c (Completed)    Ambulatory Referral to Podiatry       Cardiovascular and Mediastinum    Essential hypertension     -BP today with good control  To continue on current medication regimen  Currently on Nifedipine, Lisinopril, Lasix and Hydralazine  He follows with nephrologist  Will continue on current regimen no changes  Relevant Medications    lisinopril (ZESTRIL) 40 mg tablet       Other    Neck pain    Relevant Medications    ibuprofen (MOTRIN) 800 mg tablet    Other Relevant Orders    Ambulatory Referral to Orthopedic Surgery    Fatigue    Relevant Orders    CBC and differential    TSH, 3rd generation with Free T4 reflex    Mixed hyperlipidemia    Relevant Medications    atorvastatin (LIPITOR) 40 mg tablet    Other Relevant Orders    Lipid panel    Annual physical exam - Primary     Patient is here for physical exam we find this visit without any distress or abnormalities  We recommended exercise at least three and 0 5 hours per week and healthy diet with a low carbohydrate and low saturated fat   Began to follow up in one year for regular physical exams  Hyperglycemia    Relevant Orders    Comprehensive metabolic panel    Screening for colon cancer    Relevant Orders    Ambulatory Referral to General Surgery    Class 2 severe obesity due to excess calories with serious comorbidity and body mass index (BMI) of 36 0 to 36 9 in adult [facility])     -discussed diet modifications and exercise with patient  Foods to avoid and carbs/sweets  Immunizations and preventive care screenings were discussed with patient today  Appropriate education was printed on patient's after visit summary  Counseling:  Alcohol/drug use: discussed moderation in alcohol intake, the recommendations for healthy alcohol use, and avoidance of illicit drug use  Dental Health: discussed importance of regular tooth brushing, flossing, and dental visits  Injury prevention: discussed safety/seat belts, safety helmets, smoke detectors, carbon dioxide detectors, and smoking near bedding or upholstery  Sexual health: discussed sexually transmitted diseases, partner selection, use of condoms, avoidance of unintended pregnancy, and contraceptive alternatives  · Exercise: the importance of regular exercise/physical activity was discussed  Recommend exercise 3-5 times per week for at least 30 minutes  BMI Counseling: Body mass index is 35 44 kg/m²  The BMI is above normal  Nutrition recommendations include encouraging healthy choices of fruits and vegetables, decreasing fast food intake, consuming healthier snacks, limiting drinks that contain sugar, increasing intake of lean protein, reducing intake of saturated and trans fat and reducing intake of cholesterol  Exercise recommendations include exercising 3-5 times per week  Rationale for BMI follow-up plan is due to patient being overweight or obese  Depression Screening and Follow-up Plan: Patient assessed for underlying major depression   Brief counseling provided and recommend additional follow-up/re-evaluation next office visit  Return in 3 months (on 2022) for Diabetes Follow-up  Chief Complaint:     Chief Complaint   Patient presents with    Physical Exam      History of Present Illness:     Adult Annual Physical   Patient here for a comprehensive physical exam  The patient reports problems - chronic neck pain  Diet and Physical Activity  · Diet/Nutrition: well balanced diet and consuming 3-5 servings of fruits/vegetables daily  · Exercise: no formal exercise  Depression Screening  PHQ-2/9 Depression Screening    Little interest or pleasure in doing things: 2 - more than half the days  Feeling down, depressed, or hopeless: 0 - not at all  Trouble falling or staying asleep, or sleeping too much: 1 - several days  Feeling tired or having little energy: 2 - more than half the days  Poor appetite or overeatin - more than half the days  Feeling bad about yourself - or that you are a failure or have let yourself or your family down: 2 - more than half the days  Trouble concentrating on things, such as reading the newspaper or watching television: 2 - more than half the days  Moving or speaking so slowly that other people could have noticed  Or the opposite - being so fidgety or restless that you have been moving around a lot more than usual: 0 - not at all  Thoughts that you would be better off dead, or of hurting yourself in some way: 0 - not at all       General Health  · Sleep: gets 4-6 hours of sleep on average  · Hearing: normal - bilateral   · Vision: most recent eye exam >1 year ago  · Dental: no dental visits for >1 year and brushes teeth twice daily   Health  · Symptoms include: none     Review of Systems:     Review of Systems   Constitutional: Negative  Negative for appetite change, fatigue and fever  HENT: Negative  Negative for congestion, ear pain, postnasal drip, rhinorrhea, sore throat and voice change  Eyes: Negative      Respiratory: Negative  Negative for cough, chest tightness, shortness of breath and wheezing  Cardiovascular: Negative  Negative for chest pain and palpitations  Gastrointestinal: Negative  Negative for abdominal distention  Endocrine: Negative  Genitourinary: Negative  Negative for difficulty urinating  Musculoskeletal: Positive for neck pain (chronic)  Negative for arthralgias  Skin: Negative  Negative for color change and rash  Allergic/Immunologic: Negative  Neurological: Negative  Negative for dizziness and headaches  Hematological: Negative  Does not bruise/bleed easily  Psychiatric/Behavioral: Negative  Past Medical History:     Past Medical History:   Diagnosis Date    Depression     last assessed - 22Oct2012    Diabetes mellitus ([facility] [address] )     Hepatomegaly     Hypertension     Kidney stone     Obesity     Splenomegaly     Syncope     last assessed - 22Jul2013    Vertigo     last assessed - 05Aug2013      Past Surgical History:     Past Surgical History:   Procedure Laterality Date    NEUROPLASTY / TRANSPOSITION MEDIAN NERVE AT CARPAL TUNNEL      TONSILLECTOMY        Family History:     Family History   Problem Relation Age of Onset    Hypertension Mother         Benign Essential HTN    Heart disease Mother     Sleep apnea Mother     Diabetes Father     Heart disease Father     Hypertension Father         Benign Essential HTN      Social History:     Social History     Socioeconomic History    Marital status: /Civil Union     Spouse name: None    Number of children: None    Years of education: None    Highest education level: None   Occupational History    None   Tobacco Use    Smoking status: Never Smoker    Smokeless tobacco: Current User    Tobacco comment: Former smoker   Substance and Sexual Activity    Alcohol use:  Yes     Alcohol/week: 8 0 standard drinks     Types: 2 Cans of beer, 6 Shots of liquor per week     Comment: social drinker    Drug use: No    Sexual activity: Yes     Partners: Female     Birth control/protection: None   Other Topics Concern    None   Social History Narrative    None     Social Determinants of Health     Financial Resource Strain: Not on file   Food Insecurity: Not on file   Transportation Needs: Not on file   Physical Activity: Not on file   Stress: Not on file   Social Connections: Not on file   Intimate Partner Violence: Not on file   Housing Stability: Not on file      Current Medications:     Current Outpatient Medications   Medication Sig Dispense Refill    Accu-Chek FastClix Lancets MISC by Does not apply route 2 (two) times a day 60 each 5    ACCU-CHEK GUIDE test strip USE 1 STRIP TWICE A DAY   60 each 5    atorvastatin (LIPITOR) 40 mg tablet Take 1 tablet (40 mg total) by mouth daily 90 tablet 3    cyanocobalamin ([employer] VITAMIN B-12) 1000 MCG tablet Take 1,000 mcg by mouth      ergocalciferol (VITAMIN D2) 50,000 units TAKE 1 CAPSULE BY MOUTH ONCE A WEEK 4 capsule 5    furosemide (LASIX) 40 mg tablet Take 1 tablet (40 mg total) by mouth daily 90 tablet 3    hydrALAZINE (APRESOLINE) 50 mg tablet Take 1 tablet (50 mg total) by mouth 2 (two) times a day 180 tablet 4    insulin glargine (Lantus SoloStar) 100 units/mL injection pen Inject 40 Units under the skin daily at bedtime 12 mL 3    Insulin Pen Needle (B-D UF III MINI PEN NEEDLES) 31G X 5 MM MISC Inject under the skin daily at bedtime 100 each 3    lisinopril (ZESTRIL) 40 mg tablet Take 1 tablet (40 mg total) by mouth daily 90 tablet 1    metFORMIN (GLUCOPHAGE) 1000 MG tablet Take 1 tablet (1,000 mg total) by mouth 2 (two) times a day with meals 180 tablet 3    NIFEdipine (PROCARDIA XL) 60 mg 24 hr tablet Take 1 tablet (60 mg total) by mouth daily at bedtime 90 tablet 2    sertraline (ZOLOFT) 100 mg tablet Take 1 tablet (100 mg total) by mouth daily 90 tablet 3    acyclovir (ZOVIRAX) 400 MG tablet Take 1 tablet (400 mg total) by mouth 3 (three) times a day for 7 days 21 tablet 0    ibuprofen (MOTRIN) 800 mg tablet Take 1 tablet (800 mg total) by mouth every 8 (eight) hours as needed for mild pain or moderate pain 30 tablet 0    Semaglutide,0 25 or 0 5MG/DOS, 2 MG/1 5ML SOPN Inject 0 25 mg under the skin every 7 days for 30 days, THEN 0 5 mg every 7 days  4 5 mL 0     No current facility-administered medications for this visit  Allergies:     No Known Allergies   Physical Exam:     /80 (BP Location: Left arm, Patient Position: Sitting, Cuff Size: Standard)   Pulse 75   Temp 97 8 °F (36 6 °C) (Tympanic)   Resp 18   Ht 5' 9" (1 753 m)   Wt 109 kg (240 lb)   SpO2 99%   BMI 35 44 kg/m²     Physical Exam  Vitals and nursing note reviewed  Constitutional:       General: He is not in acute distress  Appearance: Normal appearance  He is obese  He is not ill-appearing  HENT:      Head: Normocephalic and atraumatic  Right Ear: Tympanic membrane, ear canal and external ear normal  There is no impacted cerumen  Left Ear: Tympanic membrane, ear canal and external ear normal  There is no impacted cerumen  Nose: Nose normal  No congestion or rhinorrhea  Mouth/Throat:      Mouth: Mucous membranes are moist       Pharynx: Oropharynx is clear  No oropharyngeal exudate or posterior oropharyngeal erythema  Eyes:      Extraocular Movements: Extraocular movements intact  Conjunctiva/sclera: Conjunctivae normal       Pupils: Pupils are equal, round, and reactive to light  Comments: Uses glasses   Cardiovascular:      Rate and Rhythm: Normal rate  Pulses: Normal pulses  no weak pulses          Dorsalis pedis pulses are 2+ on the right side and 2+ on the left side  Posterior tibial pulses are 2+ on the right side and 2+ on the left side  Heart sounds: Normal heart sounds  Pulmonary:      Effort: Pulmonary effort is normal       Breath sounds: Normal breath sounds     Abdominal:      General: Bowel sounds are normal  Palpations: Abdomen is soft  Genitourinary:     Comments: Refused prostate exam  Musculoskeletal:         General: No tenderness or deformity  Normal range of motion  Cervical back: Normal range of motion and neck supple  Feet:    Feet:      Right foot:      Skin integrity: No ulcer, skin breakdown, erythema, warmth, callus or dry skin  Left foot:      Skin integrity: No ulcer, skin breakdown, erythema, warmth, callus or dry skin  Skin:     General: Skin is warm and dry  Capillary Refill: Capillary refill takes less than 2 seconds  Neurological:      General: No focal deficit present  Mental Status: He is alert and oriented to person, place, and time  Psychiatric:         Mood and Affect: Mood normal          Behavior: Behavior normal           Lory Saint, CRNP  [facility] CARE [facility]       Patient's shoes and socks removed  Right Foot/Ankle   Right Foot Inspection  Skin Exam: skin normal and skin intact  No dry skin, no warmth, no callus, no erythema, no maceration, no abnormal color, no pre-ulcer, no ulcer and no callus  Toe Exam: ROM and strength within normal limits  No swelling, no tenderness, erythema and  no right toe deformity    Sensory   Vibration: intact  Proprioception: diminished  Monofilament testing: intact    Vascular  Capillary refills: < 3 seconds  The right DP pulse is 2+  The right PT pulse is 2+  Left Foot/Ankle  Left Foot Inspection  Skin Exam: skin normal and skin intact  No dry skin, no warmth, no erythema, no maceration, normal color, no pre-ulcer, no ulcer and no callus  Toe Exam: ROM and strength within normal limits  No swelling, no tenderness, no erythema and no left toe deformity  Sensory   Vibration: intact  Proprioception: diminished  Monofilament testing: intact    Vascular  Capillary refills: < 3 seconds  The left DP pulse is 2+  The left PT pulse is 2+       Assign Risk Category  No deformity present  No loss of protective sensation  No weak pulses  Risk: 0

## 2022-04-08 LAB
LEFT EYE DIABETIC RETINOPATHY: NORMAL
LEFT EYE IMAGE QUALITY: NORMAL
LEFT EYE MACULAR EDEMA: NORMAL
LEFT EYE OTHER RETINOPATHY: NORMAL
RIGHT EYE DIABETIC RETINOPATHY: NORMAL
RIGHT EYE IMAGE QUALITY: NORMAL
RIGHT EYE MACULAR EDEMA: NORMAL
RIGHT EYE OTHER RETINOPATHY: NORMAL
SEVERITY (EYE EXAM): NORMAL

## 2022-04-08 PROCEDURE — 2025F 7 FLD RTA PHOTO W/O RTNOPTHY: CPT | Performed by: NURSE PRACTITIONER

## 2022-05-25 ENCOUNTER — OFFICE VISIT (OUTPATIENT)
Dept: PODIATRY | Facility: CLINIC | Age: 52
End: 2022-05-25
Payer: COMMERCIAL

## 2022-05-25 VITALS
HEIGHT: 69 IN | WEIGHT: 241.4 LBS | DIASTOLIC BLOOD PRESSURE: 82 MMHG | SYSTOLIC BLOOD PRESSURE: 131 MMHG | BODY MASS INDEX: 35.76 KG/M2 | HEART RATE: 76 BPM

## 2022-05-25 DIAGNOSIS — M72.2 PLANTAR FASCIITIS, BILATERAL: Primary | ICD-10-CM

## 2022-05-25 DIAGNOSIS — E11.42 DIABETIC POLYNEUROPATHY ASSOCIATED WITH TYPE 2 DIABETES MELLITUS (HCC): ICD-10-CM

## 2022-05-25 PROCEDURE — 3079F DIAST BP 80-89 MM HG: CPT | Performed by: PODIATRIST

## 2022-05-25 PROCEDURE — 1036F TOBACCO NON-USER: CPT | Performed by: PODIATRIST

## 2022-05-25 PROCEDURE — 3075F SYST BP GE 130 - 139MM HG: CPT | Performed by: PODIATRIST

## 2022-05-25 PROCEDURE — 99204 OFFICE O/P NEW MOD 45 MIN: CPT | Performed by: PODIATRIST

## 2022-05-25 PROCEDURE — 3008F BODY MASS INDEX DOCD: CPT | Performed by: PODIATRIST

## 2022-05-25 NOTE — PROGRESS NOTES
Assessment/Plan:         Diagnoses and all orders for this visit:    Plantar fasciitis, bilateral    Diabetic polyneuropathy associated with type 2 diabetes mellitus ([facility] )  -     Ambulatory Referral to Podiatry          Diagnosis and options discussed with patient  Patient agreeable to the plan as stated below    -Educated on DM risk to lower extremities, proper shoe gear, and daily monitoring of feet    -Educated on A1C and the risks of poorly controlled Diabetes  Reviewed recent A1C, over 9  -Discussed weight loss and suitable exercise regiment  Reviewed most recent PCP visit on 4/7/2022  -Patient is poorly controlled, consider endocrinology consult  HE has early neuropathy in the toes  We spent 30 minutes on this specific education, risks, etc    Regarding plantar fascitis, do not recoomend cortisone shot today due to BG  1  Discussed diagnosis and treatment options  2  Provided home therapy and stretching exercises  3  Deferred injections today, but did discuss the possibility  4  Stressed compliance with home/formal PT and arch supports  5  RTC 6 weeks to check heel pain      Subjective:      Patient ID: Hu Hoffman is a 50 y o  male  PAtient gets a lot of pain in his feet  (he is pointing to his plantar heel)  PAin has been present for a few months  Pain is severe after periods of rest  Its mildly better in a sneaker compared to barefoot  HE denies trauma  He is Diabetic type 2, A1C consistently high  He is working with his PCP to try to lower it but the medication is expensive  He is trying to change his diet  The following portions of the patient's history were reviewed and updated as appropriate:   He  has a past medical history of Depression, Diabetes mellitus ([facility] ), Hepatomegaly, Hypertension, Kidney stone, Obesity, Splenomegaly, Syncope, and Vertigo    He   Patient Active Problem List    Diagnosis Date Noted    Annual physical exam 04/07/2022    Type 2 diabetes mellitus with hyperglycemia, without long-term current use of insulin [facility] ) 04/07/2022    Hyperglycemia 04/07/2022    Screening for colon cancer 04/07/2022    Class 2 severe obesity due to excess calories with serious comorbidity and body mass index (BMI) of 36 0 to 36 9 in adult [facility]) 04/07/2022    Arthralgia 08/17/2021    Bilateral wrist pain 08/17/2021    Nephrolithiasis 07/16/2021    Severe episode of recurrent major depressive disorder, without psychotic features ([facility] ) 06/29/2021    Encounter for hepatitis C screening test for low risk patient 06/29/2021    Chronic kidney disease 06/29/2021    Stage 1 chronic kidney disease 04/27/2021    Resistant hypertension 03/29/2021    Albuminuria 03/29/2021    Generalized body aches 11/13/2020    Exposure to COVID-19 virus 11/04/2020    Nausea 11/04/2020    Diarrhea 11/04/2020    Screening for cholesterol level 07/07/2020    Essential hypertension 01/13/2020    Mixed hyperlipidemia 07/13/2019    Episode of recurrent major depressive disorder ([facility] ) 02/12/2019    Other hemorrhoids 01/07/2019    Metformin adverse reaction 10/17/2018    Acute otitis media 06/20/2018    Type 2 diabetes mellitus with chronic kidney disease, with long-term current use of insulin ([facility] ) 06/20/2018    Acute bacterial sinusitis 06/20/2018    Hypertension 01/29/2018    Candidal balanitis 12/27/2016    Dyslipidemia 10/04/2016    Microalbuminuria 10/04/2016    Neck pain 11/05/2015    Vitamin D deficiency 06/30/2015    Snoring 06/29/2015    Reduced libido 06/23/2015    Fatigue 06/23/2015    Tinea pedis 08/01/2014    Trigger thumb of right hand 03/28/2014    Anxiety 07/15/2013    Depression 07/15/2013    Plantar fasciitis 05/06/2013    Class 1 obesity due to excess calories with serious comorbidity and body mass index (BMI) of 34 0 to 34 9 in adult 02/11/2013    Allergic rhinitis 10/22/2012    Multiple joint pain 10/22/2012     He  has a past surgical history that includes Neuroplasty / transposition median nerve at carpal tunnel and Tonsillectomy  His family history includes Diabetes in his father; Heart disease in his father and mother; Hypertension in his father and mother; Sleep apnea in his mother  He  reports that he has never smoked  He uses smokeless tobacco  He reports current alcohol use of about 8 0 standard drinks of alcohol per week  He reports that he does not use drugs  Current Outpatient Medications   Medication Sig Dispense Refill    Accu-Chek FastClix Lancets MISC by Does not apply route 2 (two) times a day 60 each 5    ACCU-CHEK GUIDE test strip USE 1 STRIP TWICE A DAY   60 each 5    atorvastatin (LIPITOR) 40 mg tablet Take 1 tablet (40 mg total) by mouth daily 90 tablet 3    cyanocobalamin (VITAMIN B-12) 1000 MCG tablet Take 1,000 mcg by mouth      ergocalciferol (VITAMIN D2) 50,000 units TAKE 1 CAPSULE BY MOUTH ONCE A WEEK 4 capsule 5    furosemide (LASIX) 40 mg tablet Take 1 tablet (40 mg total) by mouth daily 90 tablet 3    hydrALAZINE (APRESOLINE) 50 mg tablet Take 1 tablet (50 mg total) by mouth 2 (two) times a day 180 tablet 4    ibuprofen (MOTRIN) 800 mg tablet Take 1 tablet (800 mg total) by mouth every 8 (eight) hours as needed for mild pain or moderate pain 30 tablet 0    insulin glargine (Lantus SoloStar) 100 units/mL injection pen Inject 40 Units under the skin daily at bedtime 12 mL 3    Insulin Pen Needle (B-D UF III MINI PEN NEEDLES) 31G X 5 MM MISC Inject under the skin daily at bedtime 100 each 3    lisinopril (ZESTRIL) 40 mg tablet Take 1 tablet (40 mg total) by mouth daily 90 tablet 1    metFORMIN (GLUCOPHAGE) 1000 MG tablet Take 1 tablet (1,000 mg total) by mouth 2 (two) times a day with meals 180 tablet 3    NIFEdipine (PROCARDIA XL) 60 mg 24 hr tablet Take 1 tablet (60 mg total) by mouth daily at bedtime 90 tablet 2    Semaglutide,0 25 or 0 5MG/DOS, 2 MG/1 5ML SOPN Inject 0 25 mg under the skin every 7 days for 30 days, THEN 0 5 mg every 7 days  4 5 mL 0    sertraline (ZOLOFT) 100 mg tablet Take 1 tablet (100 mg total) by mouth daily 90 tablet 3    acyclovir (ZOVIRAX) 400 MG tablet Take 1 tablet (400 mg total) by mouth 3 (three) times a day for 7 days 21 tablet 0     No current facility-administered medications for this visit  Current Outpatient Medications on File Prior to Visit   Medication Sig    Accu-Chek FastClix Lancets MISC by Does not apply route 2 (two) times a day    ACCU-CHEK GUIDE test strip USE 1 STRIP TWICE A DAY   atorvastatin (LIPITOR) 40 mg tablet Take 1 tablet (40 mg total) by mouth daily    cyanocobalamin (VITAMIN B-12) 1000 MCG tablet Take 1,000 mcg by mouth    ergocalciferol (VITAMIN D2) 50,000 units TAKE 1 CAPSULE BY MOUTH ONCE A WEEK    furosemide (LASIX) 40 mg tablet Take 1 tablet (40 mg total) by mouth daily    hydrALAZINE (APRESOLINE) 50 mg tablet Take 1 tablet (50 mg total) by mouth 2 (two) times a day    ibuprofen (MOTRIN) 800 mg tablet Take 1 tablet (800 mg total) by mouth every 8 (eight) hours as needed for mild pain or moderate pain    insulin glargine (Lantus SoloStar) 100 units/mL injection pen Inject 40 Units under the skin daily at bedtime    Insulin Pen Needle (B-D UF III MINI PEN NEEDLES) 31G X 5 MM MISC Inject under the skin daily at bedtime    lisinopril (ZESTRIL) 40 mg tablet Take 1 tablet (40 mg total) by mouth daily    metFORMIN (GLUCOPHAGE) 1000 MG tablet Take 1 tablet (1,000 mg total) by mouth 2 (two) times a day with meals    NIFEdipine (PROCARDIA XL) 60 mg 24 hr tablet Take 1 tablet (60 mg total) by mouth daily at bedtime    Semaglutide,0 25 or 0 5MG/DOS, 2 MG/1 5ML SOPN Inject 0 25 mg under the skin every 7 days for 30 days, THEN 0 5 mg every 7 days      sertraline (ZOLOFT) 100 mg tablet Take 1 tablet (100 mg total) by mouth daily    acyclovir (ZOVIRAX) 400 MG tablet Take 1 tablet (400 mg total) by mouth 3 (three) times a day for 7 days    [DISCONTINUED] amLODIPine (NORVASC) 10 mg tablet Take 10 mg by mouth daily    [DISCONTINUED] celecoxib (CeleBREX) 100 mg capsule Take 100 mg by mouth 2 (two) times a day    [DISCONTINUED] meloxicam (MOBIC) 15 mg tablet meloxicam 15 mg tablet   take 1 tablet once daily    [DISCONTINUED] metoprolol tartrate (LOPRESSOR) 100 mg tablet take 1 tablet by mouth once daily     No current facility-administered medications on file prior to visit  He has No Known Allergies       Review of Systems   Constitutional: Negative  HENT: Negative  Respiratory: Negative  Cardiovascular: Negative  Gastrointestinal: Negative  Musculoskeletal: Positive for arthralgias and joint swelling  Neurological: Negative for numbness  Objective:      /82   Pulse 76   Ht 5' 9" (1 753 m) Comment: verbal  Wt 109 kg (241 lb 6 4 oz)   BMI 35 65 kg/m²     Diabetes   Hemoglobin A1C 6 5 9 2Abnormal  (4/7/22) 9 2Abnormal   8  3High            Physical Exam  Vitals reviewed  Constitutional:       Appearance: He is obese  He is not ill-appearing or diaphoretic  HENT:      Nose: No congestion or rhinorrhea  Cardiovascular:      Rate and Rhythm: Normal rate  Pulses: Normal pulses  no weak pulses          Dorsalis pedis pulses are 2+ on the right side and 2+ on the left side  Posterior tibial pulses are 2+ on the right side and 2+ on the left side  Pulmonary:      Effort: Pulmonary effort is normal  No respiratory distress  Musculoskeletal:         General: Tenderness present  Right foot: Normal range of motion  No deformity, bunion or prominent metatarsal heads  Left foot: Normal range of motion  No deformity, bunion or prominent metatarsal heads  Feet:    Feet:      Right foot:      Protective Sensation: 10 sites tested  8 sites sensed  Skin integrity: Skin integrity normal  No ulcer, skin breakdown, erythema, warmth, callus or dry skin        Toenail Condition: Right toenails are normal       Left foot: Protective Sensation: 10 sites tested  7 sites sensed  Skin integrity: Skin integrity normal  No ulcer, skin breakdown, erythema, warmth, callus or dry skin  Toenail Condition: Left toenails are normal    Skin:     General: Skin is warm  Capillary Refill: Capillary refill takes less than 2 seconds  Findings: No erythema, lesion or rash  Neurological:      Mental Status: He is alert and oriented to person, place, and time  Sensory: Sensory deficit present  Motor: No weakness  Gait: Gait normal    Psychiatric:         Mood and Affect: Mood normal              Diabetic Foot Exam    Patient's shoes and socks removed  Right Foot/Ankle   Right Foot Inspection  Skin Exam: skin normal and skin intact  No dry skin, no warmth, no callus, no erythema, no maceration, no abnormal color, no pre-ulcer, no ulcer and no callus  Toe Exam: No swelling, no tenderness, erythema and  no right toe deformity    Sensory   Vibration: diminished  Proprioception: intact  Monofilament testing: diminished    Vascular  Capillary refills: < 3 seconds  The right DP pulse is 2+  The right PT pulse is 2+  Right Toe  - Comprehensive Exam  Arch: pes planus  Hallux valgus: no  Tenderness: plantar fascia         Left Foot/Ankle  Left Foot Inspection  Skin Exam: skin normal and skin intact  No dry skin, no warmth, no erythema, no maceration, normal color, no pre-ulcer, no ulcer and no callus  Toe Exam: No swelling, no tenderness, no erythema and no left toe deformity  Sensory   Vibration: diminished  Proprioception: intact  Monofilament testing: diminished    Vascular  Capillary refills: < 3 seconds  The left DP pulse is 2+  The left PT pulse is 2+       Left Toe  - Comprehensive Exam  Arch: pes planus  Hallux valgus: no  Tenderness: plantar fascia           Assign Risk Category  No deformity present  Loss of protective sensation  No weak pulses  Risk: 1

## 2022-05-25 NOTE — PATIENT INSTRUCTIONS
Plantar Fasciitis Exercises   WHAT YOU NEED TO KNOW:   Plantar fasciitis exercises help stretch your plantar fascia, calf muscles, and Achilles tendon  They also help strengthen the muscles that support your heel and foot  Exercises and stretching can help prevent plantar fasciitis from getting worse or coming back  DISCHARGE INSTRUCTIONS:   Contact your healthcare provider if:   Your pain and swelling increase  You develop new knee, hip, or back pain  You have questions or concerns about your condition or care  How to do plantar fasciitis exercises:  Ask your healthcare provider when to start these exercises and how often to do them  Slant board stretch:  Stand on a slanted board with your toes higher than your heel  Press your heel into the board  Keep your knee slightly bent  Hold this position for 1 minute  Repeat 5 times  Heel stretch:  Stand up straight with your hands on a wall  Place your injured leg slightly behind your other leg  Keep your heels flat on the floor, lean forward, and bend both knees  Hold for 30 seconds  Calf stretch:  Stand up straight with your hands on a wall  Step forward so that your uninjured foot is in front of your injured foot  Keep your front leg bent and your back leg straight  Gently lean forward until you feel your calf stretch  Hold for 30 seconds and then relax  Seated plantar fascia stretch:  Sit on a firm surface, such as the floor or a mat  Extend your legs out in front of you  Raise your injured foot a few inches off the ground  Keep your leg straight  Grab the toes of your injured foot and pull them toward you  With your other hand, feel your plantar fascia  You should feel it press outward  Hold for 30 seconds  If you cannot reach your toes, loop a towel or tie around your foot  Gently pull on the towel or tie and flex your toes toward you  Heel raises:  Stand on the injured leg  Raise your other leg off the ground   Hold onto a railing or wall for balance  Slowly rise up on the toes of your injured leg  Hold for 5 seconds  Slowly lower your heel to the ground  Toe curls:  Place a towel on the floor  Put your foot flat on the towel  Grab the towel with your toes by curling them around the towel  Lift the towel up with your toes  Toe taps:  Sit down and place your foot flat on the floor  Keep your heel on the floor  Point all your toes up toward the ceiling  While the 4 smaller toes are pointed up, bend your big toe down and tap it on the ground  Do 10 to 50 taps  Point all 5 toes up toward the ceiling again  This time keep your big toe pointed up and tap the 4 smaller toes on the ground  Do 10 to 50 taps each time  Follow up with your doctor as directed:  Write down your questions so you remember to ask them during your visits  © Copyright [employer] 2022 Information is for End User's use only and may not be sold, redistributed or otherwise used for commercial purposes  All illustrations and images included in CareNotes® are the copyrighted property of A [employer]  or [employer]   The above information is an  only  It is not intended as medical advice for individual conditions or treatments  Talk to your doctor, nurse or pharmacist before following any medical regimen to see if it is safe and effective for you

## 2022-06-16 ENCOUNTER — TELEPHONE (OUTPATIENT)
Dept: NEPHROLOGY | Facility: CLINIC | Age: 52
End: 2022-06-16

## 2022-06-20 NOTE — TELEPHONE ENCOUNTER
Left message to schedule follow up appointment with Dr Merlin Perch in [facility]  This is the second attempt

## 2022-06-27 ENCOUNTER — VBI (OUTPATIENT)
Dept: ADMINISTRATIVE | Facility: OTHER | Age: 52
End: 2022-06-27

## 2022-07-14 ENCOUNTER — VBI (OUTPATIENT)
Dept: ADMINISTRATIVE | Facility: OTHER | Age: 52
End: 2022-07-14

## 2022-07-25 ENCOUNTER — RA CDI HCC (OUTPATIENT)
Dept: OTHER | Facility: HOSPITAL | Age: 52
End: 2022-07-25

## 2022-07-25 NOTE — PROGRESS NOTES
[facility]  coding opportunities       Chart Reviewed number of suggestions sent to Provider: 1     Patients Insurance     Commercial Insurance: [facility]     E11 22: Type 2 diabetes mellitus with diabetic chronic kidney disease [E11 22]    DM & CKD are presumed to have a causal-effect relationship unless documented as unrelated

## 2022-08-08 ENCOUNTER — OFFICE VISIT (OUTPATIENT)
Dept: URGENT CARE | Age: 52
End: 2022-08-08
Payer: COMMERCIAL

## 2022-08-08 VITALS — RESPIRATION RATE: 16 BRPM | HEART RATE: 83 BPM | OXYGEN SATURATION: 99 % | TEMPERATURE: 97.9 F

## 2022-08-08 DIAGNOSIS — S49.91XA INJURY OF RIGHT SHOULDER, INITIAL ENCOUNTER: Primary | ICD-10-CM

## 2022-08-08 PROCEDURE — G0382 LEV 3 HOSP TYPE B ED VISIT: HCPCS | Performed by: NURSE PRACTITIONER

## 2022-08-08 PROCEDURE — S9083 URGENT CARE CENTER GLOBAL: HCPCS | Performed by: NURSE PRACTITIONER

## 2022-08-08 RX ORDER — METAXALONE 800 MG/1
800 TABLET ORAL 3 TIMES DAILY PRN
Qty: 20 TABLET | Refills: 0 | Status: SHIPPED | OUTPATIENT
Start: 2022-08-08

## 2022-08-08 RX ORDER — PREDNISONE 20 MG/1
20 TABLET ORAL 2 TIMES DAILY WITH MEALS
Qty: 10 TABLET | Refills: 0 | Status: SHIPPED | OUTPATIENT
Start: 2022-08-08 | End: 2022-08-13

## 2022-08-08 NOTE — PROGRESS NOTES
[facility]        NAME: Felice Cardona is a 50 y o  male  : 1970    MRN: [id number]  DATE: 2022  TIME: 1:52 PM    Assessment and Plan   Injury of right shoulder, initial encounter [S49 [id number]]  1  Injury of right shoulder, initial encounter  predniSONE 20 mg tablet    metaxalone (SKELAXIN) 800 mg tablet         Patient Instructions     Take med as directed  Muscle relaxant can cause drowsiness   Follow up with PCP in 3-5 days  Proceed to  ER if symptoms worsen  Chief Complaint     Chief Complaint   Patient presents with    Shoulder Pain     Right shoulder pain for a few days         History of Present Illness       HPI   Reports he was working on his car 1 week ago and injured the right shoulder  Yesterday while working on his tire, he lifted the tire and the pain got worse  Says he has been taking OTC med for pain with no improvement  Review of Systems   Review of Systems   Constitutional: Negative for chills and fever  Musculoskeletal: Positive for arthralgias (right shoulder)  Skin: Negative for rash and wound  Neurological: Negative for weakness and numbness           Current Medications       Current Outpatient Medications:     metaxalone (SKELAXIN) 800 mg tablet, Take 1 tablet (800 mg total) by mouth 3 (three) times a day as needed for muscle spasms (may cause drowsiness), Disp: 20 tablet, Rfl: 0    predniSONE 20 mg tablet, Take 1 tablet (20 mg total) by mouth 2 (two) times a day with meals for 5 days, Disp: 10 tablet, Rfl: 0    Accu-Chek FastClix Lancets MISC, by Does not apply route 2 (two) times a day, Disp: 60 each, Rfl: 5    ACCU-CHEK GUIDE test strip, USE 1 STRIP TWICE A DAY , Disp: 60 each, Rfl: 5    acyclovir (ZOVIRAX) 400 MG tablet, Take 1 tablet (400 mg total) by mouth 3 (three) times a day for 7 days, Disp: 21 tablet, Rfl: 0    atorvastatin (LIPITOR) 40 mg tablet, Take 1 tablet (40 mg total) by mouth daily, Disp: 90 tablet, Rfl: 3    cyanocobalamin (VITAMIN B-12) 1000 MCG tablet, Take 1,000 mcg by mouth, Disp: , Rfl:     ergocalciferol (VITAMIN D2) 50,000 units, TAKE 1 CAPSULE BY MOUTH ONCE A WEEK, Disp: 4 capsule, Rfl: 5    furosemide (LASIX) 40 mg tablet, Take 1 tablet (40 mg total) by mouth daily, Disp: 90 tablet, Rfl: 3    hydrALAZINE (APRESOLINE) 50 mg tablet, Take 1 tablet (50 mg total) by mouth 2 (two) times a day, Disp: 180 tablet, Rfl: 4    ibuprofen (MOTRIN) 800 mg tablet, Take 1 tablet (800 mg total) by mouth every 8 (eight) hours as needed for mild pain or moderate pain, Disp: 30 tablet, Rfl: 0    insulin glargine (Lantus SoloStar) 100 units/mL injection pen, Inject 40 Units under the skin daily at bedtime, Disp: 12 mL, Rfl: 3    Insulin Pen Needle (B-D UF III MINI PEN NEEDLES) 31G X 5 MM MISC, Inject under the skin daily at bedtime, Disp: 100 each, Rfl: 3    lisinopril (ZESTRIL) 40 mg tablet, Take 1 tablet (40 mg total) by mouth daily, Disp: 90 tablet, Rfl: 1    metFORMIN (GLUCOPHAGE) 1000 MG tablet, Take 1 tablet (1,000 mg total) by mouth 2 (two) times a day with meals, Disp: 180 tablet, Rfl: 3    NIFEdipine (PROCARDIA XL) 60 mg 24 hr tablet, Take 1 tablet (60 mg total) by mouth daily at bedtime, Disp: 90 tablet, Rfl: 2    sertraline (ZOLOFT) 100 mg tablet, Take 1 tablet (100 mg total) by mouth daily, Disp: 90 tablet, Rfl: 3    Current Allergies     Allergies as of 08/08/2022    (No Known Allergies)            The following portions of the patient's history were reviewed and updated as appropriate: allergies, current medications, past family history, past medical history, past social history, past surgical history and problem list      Past Medical History:   Diagnosis Date    Depression     last assessed - 22Oct2012    Diabetes mellitus ([facility] )     Hepatomegaly     Hypertension     Kidney stone     Obesity     Splenomegaly     Syncope     last assessed - [id number]    Vertigo     last assessed - [id number]       Past Surgical History:   Procedure Laterality Date    NEUROPLASTY / TRANSPOSITION MEDIAN NERVE AT CARPAL TUNNEL      TONSILLECTOMY         Family History   Problem Relation Age of Onset    Hypertension Mother         Benign Essential HTN    Heart disease Mother     Sleep apnea Mother     Diabetes Father     Heart disease Father     Hypertension Father         Benign Essential HTN         Medications have been verified  Objective   Pulse 83   Temp 97 9 °F (36 6 °C) (Temporal)   Resp 16   SpO2 99%   No LMP for male patient  Physical Exam     Physical Exam  Musculoskeletal:         General: Tenderness (with palpation of the lateral anterior and posterior aspect of the shoulder and R upper arm  Also with papation of the trap muscles of the right shoulder and of the biceps) present  No swelling or deformity  Comments: Lumpy feel of the trap muscle of the R shoulder and the R bicep  ROM of the R wrist and R elbow are normal   strength is normal  Very limited ROM with lateral elevation, anterior elevation and posterior elevation, all due to pain   Skin:     Findings: No bruising or erythema

## 2022-08-17 ENCOUNTER — OFFICE VISIT (OUTPATIENT)
Dept: URGENT CARE | Age: 52
End: 2022-08-17
Payer: COMMERCIAL

## 2022-08-17 VITALS
RESPIRATION RATE: 16 BRPM | DIASTOLIC BLOOD PRESSURE: 110 MMHG | SYSTOLIC BLOOD PRESSURE: 175 MMHG | TEMPERATURE: 98.1 F | HEART RATE: 88 BPM | OXYGEN SATURATION: 99 %

## 2022-08-17 DIAGNOSIS — M25.511 ACUTE PAIN OF RIGHT SHOULDER: Primary | ICD-10-CM

## 2022-08-17 PROCEDURE — G0382 LEV 3 HOSP TYPE B ED VISIT: HCPCS

## 2022-08-17 PROCEDURE — S9083 URGENT CARE CENTER GLOBAL: HCPCS

## 2022-08-17 NOTE — PROGRESS NOTES
[id number]        NAME: Felice Cardona is a 50 y o  male  : 1970    MRN: [id number]  DATE: 2022  TIME: 10:41 AM    Assessment and Plan   Acute pain of right shoulder [M25 511]  1  Acute pain of right shoulder  Ambulatory Referral to Orthopedic Surgery   61-year-old male presents for evaluation of acute pain and bruising of the medial aspect of right inner arm  Suspect possible biceps tendon rupture, orthopedics referral in place  Patient works as a , will give 2 days off of work and patient advised to follow up with primary care provider soon as possible  May continue over-the-counter Tylenol/NSAIDs as well as ice as needed for discomfort  Patient Instructions   Follow-up with primary care provider soon as possible  Follow up with PCP in 3-5 days  Proceed to  ER if symptoms worsen  Chief Complaint     Chief Complaint   Patient presents with    Arm Pain     Right bicep bruised and swollen, recently here for right shoulder pain and prescribed steroid, Provider stated to come back if it gets worse         History of Present Illness       Patient is a 61-year-old right-handed male who presents for evaluation of right upper arm pain and bruising since Monday  Of note, he was recently seen in this clinic on 2022 for acute right shoulder pain after lifting a tire, and was prescribed a course of prednisone and Skelaxin  Past medical history significant for hypertension, diabetes mellitus  Patient reports that on Monday he was lifting a folding chair and threw it over a fence  During the course of this motion, he felt a sharp, knife-like pain along the medial aspect of his right upper arm, almost like a tearing sensation  Over the next couple of days, he noticed progressive bruising and tenderness along the medial aspect of his right upper arm  He denies direct trauma to the arm, numbness, tingling  His shoulder pain has remained the same    He does not take blood thinners  Arm Pain   Pertinent negatives include no chest pain  Review of Systems   Review of Systems   Constitutional: Negative for chills and fever  HENT: Negative for ear pain and sore throat  Eyes: Negative for pain and visual disturbance  Respiratory: Negative for cough and shortness of breath  Cardiovascular: Negative for chest pain and palpitations  Gastrointestinal: Negative for abdominal pain and vomiting  Genitourinary: Negative for dysuria and hematuria  Musculoskeletal: Positive for arthralgias  Negative for back pain and joint swelling  Skin: Positive for color change  Negative for rash  Bruising   Neurological: Negative for seizures, syncope and light-headedness  Hematological: Negative  Psychiatric/Behavioral: Negative  All other systems reviewed and are negative          Current Medications       Current Outpatient Medications:     Accu-Chek FastClix Lancets MISC, by Does not apply route 2 (two) times a day, Disp: 60 each, Rfl: 5    ACCU-CHEK GUIDE test strip, USE 1 STRIP TWICE A DAY , Disp: 60 each, Rfl: 5    atorvastatin (LIPITOR) 40 mg tablet, Take 1 tablet (40 mg total) by mouth daily, Disp: 90 tablet, Rfl: 3    cyanocobalamin (VITAMIN B-12) 1000 MCG tablet, Take 1,000 mcg by mouth, Disp: , Rfl:     ergocalciferol (VITAMIN D2) 50,000 units, TAKE 1 CAPSULE BY MOUTH ONCE A WEEK, Disp: 4 capsule, Rfl: 5    furosemide (LASIX) 40 mg tablet, Take 1 tablet (40 mg total) by mouth daily, Disp: 90 tablet, Rfl: 3    hydrALAZINE (APRESOLINE) 50 mg tablet, Take 1 tablet (50 mg total) by mouth 2 (two) times a day, Disp: 180 tablet, Rfl: 4    ibuprofen (MOTRIN) 800 mg tablet, Take 1 tablet (800 mg total) by mouth every 8 (eight) hours as needed for mild pain or moderate pain, Disp: 30 tablet, Rfl: 0    insulin glargine (Lantus SoloStar) 100 units/mL injection pen, Inject 40 Units under the skin daily at bedtime, Disp: 12 mL, Rfl: 3    Insulin Pen Needle (B-D UF III MINI PEN NEEDLES) 31G X 5 MM MISC, Inject under the skin daily at bedtime, Disp: 100 each, Rfl: 3    lisinopril (ZESTRIL) 40 mg tablet, Take 1 tablet (40 mg total) by mouth daily, Disp: 90 tablet, Rfl: 1    metaxalone (SKELAXIN) 800 mg tablet, Take 1 tablet (800 mg total) by mouth 3 (three) times a day as needed for muscle spasms (may cause drowsiness), Disp: 20 tablet, Rfl: 0    metFORMIN (GLUCOPHAGE) 1000 MG tablet, Take 1 tablet (1,000 mg total) by mouth 2 (two) times a day with meals, Disp: 180 tablet, Rfl: 3    NIFEdipine (PROCARDIA XL) 60 mg 24 hr tablet, Take 1 tablet (60 mg total) by mouth daily at bedtime, Disp: 90 tablet, Rfl: 2    sertraline (ZOLOFT) 100 mg tablet, Take 1 tablet (100 mg total) by mouth daily, Disp: 90 tablet, Rfl: 3    acyclovir (ZOVIRAX) 400 MG tablet, Take 1 tablet (400 mg total) by mouth 3 (three) times a day for 7 days, Disp: 21 tablet, Rfl: 0    Current Allergies     Allergies as of 08/17/2022    (No Known Allergies)            The following portions of the patient's history were reviewed and updated as appropriate: allergies, current medications, past family history, past medical history, past social history, past surgical history and problem list      Past Medical History:   Diagnosis Date    Depression     last assessed - 22Oct2012    Diabetes mellitus ([facility] [address] )     Hepatomegaly     Hypertension     Kidney stone     Obesity     Splenomegaly     Syncope     last assessed - [id number]    Vertigo     last assessed - 05Aug2013       Past Surgical History:   Procedure Laterality Date    NEUROPLASTY / TRANSPOSITION MEDIAN NERVE AT CARPAL TUNNEL      TONSILLECTOMY         Family History   Problem Relation Age of Onset    Hypertension Mother         Benign Essential HTN    Heart disease Mother     Sleep apnea Mother     Diabetes Father     Heart disease Father     Hypertension Father         Benign Essential HTN         Medications have been verified  Objective   BP (!) 175/110   Pulse 88   Temp 98 1 °F (36 7 °C)   Resp 16   SpO2 99%        Physical Exam     Physical Exam  Vitals reviewed  Constitutional:       General: He is not in acute distress  Appearance: Normal appearance  He is not ill-appearing, toxic-appearing or diaphoretic  HENT:      Head: Normocephalic and atraumatic  Right Ear: External ear normal       Left Ear: External ear normal       Nose: Nose normal       Mouth/Throat:      Mouth: Mucous membranes are moist    Eyes:      Extraocular Movements: Extraocular movements intact  Pupils: Pupils are equal, round, and reactive to light  Cardiovascular:      Rate and Rhythm: Normal rate and regular rhythm  Pulmonary:      Effort: Pulmonary effort is normal       Breath sounds: Normal breath sounds  Musculoskeletal:         General: Normal range of motion  Right shoulder: Tenderness present  No swelling, deformity, effusion, laceration, bony tenderness or crepitus  Normal range of motion  Normal strength  Normal pulse  Right upper arm: Tenderness present  No swelling  Arms:       Cervical back: Normal range of motion and neck supple  No rigidity or tenderness  Comments: Significant ecchymosis along medial aspect of right upper inner arm  Pain with abduction and eversion of right arm  Mildly positive hook test    Skin:     Capillary Refill: Capillary refill takes less than 2 seconds  Findings: Bruising present  No erythema  Neurological:      General: No focal deficit present  Mental Status: He is alert     Psychiatric:         Mood and Affect: Mood normal

## 2022-08-30 ENCOUNTER — OFFICE VISIT (OUTPATIENT)
Dept: OBGYN CLINIC | Facility: HOSPITAL | Age: 52
End: 2022-08-30
Payer: COMMERCIAL

## 2022-08-30 ENCOUNTER — HOSPITAL ENCOUNTER (OUTPATIENT)
Dept: MRI IMAGING | Facility: HOSPITAL | Age: 52
Discharge: HOME | End: 2022-08-30
Payer: COMMERCIAL

## 2022-08-30 ENCOUNTER — HOSPITAL ENCOUNTER (OUTPATIENT)
Dept: RADIOLOGY | Facility: HOSPITAL | Age: 52
Discharge: HOME | End: 2022-08-30
Payer: COMMERCIAL

## 2022-08-30 VITALS
HEIGHT: 69 IN | HEART RATE: 123 BPM | SYSTOLIC BLOOD PRESSURE: 163 MMHG | BODY MASS INDEX: 35.7 KG/M2 | WEIGHT: 241 LBS | DIASTOLIC BLOOD PRESSURE: 103 MMHG

## 2022-08-30 DIAGNOSIS — M25.511 ACUTE PAIN OF RIGHT SHOULDER: ICD-10-CM

## 2022-08-30 DIAGNOSIS — S46.211A BICEPS RUPTURE, DISTAL, RIGHT, INITIAL ENCOUNTER: Primary | ICD-10-CM

## 2022-08-30 DIAGNOSIS — S46.211A BICEPS RUPTURE, DISTAL, RIGHT, INITIAL ENCOUNTER: ICD-10-CM

## 2022-08-30 PROCEDURE — 3077F SYST BP >= 140 MM HG: CPT | Performed by: PHYSICIAN ASSISTANT

## 2022-08-30 PROCEDURE — 73030 X-RAY EXAM OF SHOULDER: CPT

## 2022-08-30 PROCEDURE — 3080F DIAST BP >= 90 MM HG: CPT | Performed by: PHYSICIAN ASSISTANT

## 2022-08-30 PROCEDURE — 73221 MRI JOINT UPR EXTREM W/O DYE: CPT

## 2022-08-30 PROCEDURE — G1004 CDSM NDSC: HCPCS

## 2022-08-30 PROCEDURE — 99203 OFFICE O/P NEW LOW 30 MIN: CPT | Performed by: PHYSICIAN ASSISTANT

## 2022-08-30 NOTE — PROGRESS NOTES
Assessment:    Suspect right partial distal biceps tear      Plan:    Order MRI right elbow without contrast for further evaluation  Rest, activity modification for now  Will call patient results of the study, and further treatment / follow-up thereafter  If no significant pathology, will likely initiate therapy and course of NSAID medication  Problem List Items Addressed This Visit        Other    Acute pain of right shoulder                 Subjective:     HPI    Patient ID:  Frances Zuleta is a 50 y o  male presenting for evaluation of the right shoulder  According to the patient, he presents today with acute on chronic right shoulder pain  He states over the last eight months he has been having superior anterior shoulder pain that has been dull in nature however about 3 weeks ago he was lifting a tire working on his care, and felt a pop in his lower biceps region  He states it became swollen and ecchymotic and noticed a lump where his biceps is  Over the last few weeks the pain has improved  He states his biceps strength has been affected due to this  He has occasional numbness and tingling in the right upper extremity  He has no history of surgery to the right shoulder or elbow  The following portions of the patient's history were reviewed and updated as appropriate: allergies, current medications, past family history, past medical history, past social history, past surgical history and problem list     Review of Systems     Objective:    Imaging:  Right shoulder x-rays demonstrate no acute abnormalities  Physical Exam        Orthopedic Examination:  Right shoulder     Inspection:  No open wounds or erythema  There is no significant ecchymosis or swelling  No obvious nasra deformity     Palpation:  There is tenderness of the proximal and distal biceps region, more prominent the distal biceps      Range-of-motion:  He has full elbow flexion extension    Strength:  5/5 elbow flexion extension, weak supination      Sensation:  Intact axillary muscular cutaneous median radial ulnar nerve distribution    Special Tests:  Palpable pulse  The upper extremities warm and well perfused  Able to hook distal biceps

## 2022-09-01 DIAGNOSIS — M75.21 BICEPS TENDONITIS ON RIGHT: ICD-10-CM

## 2022-09-01 DIAGNOSIS — M25.511 CHRONIC RIGHT SHOULDER PAIN: Primary | ICD-10-CM

## 2022-09-01 DIAGNOSIS — G89.29 CHRONIC RIGHT SHOULDER PAIN: Primary | ICD-10-CM

## 2022-09-13 ENCOUNTER — EVALUATION (OUTPATIENT)
Dept: PHYSICAL THERAPY | Age: 52
End: 2022-09-13
Payer: COMMERCIAL

## 2022-09-13 DIAGNOSIS — M75.21 BICEPS TENDONITIS ON RIGHT: ICD-10-CM

## 2022-09-13 DIAGNOSIS — G89.29 CHRONIC RIGHT SHOULDER PAIN: Primary | ICD-10-CM

## 2022-09-13 DIAGNOSIS — M25.511 CHRONIC RIGHT SHOULDER PAIN: Primary | ICD-10-CM

## 2022-09-13 PROCEDURE — 97110 THERAPEUTIC EXERCISES: CPT | Performed by: PHYSICAL THERAPIST

## 2022-09-13 PROCEDURE — 97162 PT EVAL MOD COMPLEX 30 MIN: CPT | Performed by: PHYSICAL THERAPIST

## 2022-09-13 NOTE — PROGRESS NOTES
PT Evaluation     Today's date: 2022  Patient name: Pacheco Velez  : 1970  MRN: [id number]  Referring provider: Timothy Osier, Kathern Brunner  Dx:   Encounter Diagnosis     ICD-10-CM    1  Chronic right shoulder pain  M25 511 Ambulatory Referral to Physical Therapy    G89 29    2  Biceps tendonitis on right  M75 21 Ambulatory Referral to Physical Therapy       Start Time: 930  Stop Time: 1015  Total time in clinic (min): 45 minutes    Assessment  Assessment details: Pt is a 49 y/o male who presents with R shoulder and elbow pain  No further referral is necessary at this time  Pt has a movement impairment diagnosis of shoulder hypomobility representing a pathoantomical diagnosis of biceps tendonopathy  Pt is experiencing pain, decreased strength, and decreased ROM  Pt has a positive prognosis  Pt would benefit from PT to address these impairments leading to increased functional capacity and improved quality of life  Impairments: abnormal or restricted ROM, impaired physical strength, lacks appropriate home exercise program, pain with function, poor posture  and poor body mechanics  Understanding of Dx/Px/POC: good   Prognosis: good    Goals  Short Term Goals: to be achieved by 4 weeks  1) Patient to be independent with basic HEP  2) Decrease pain to 2/10 at its worst   3) Increase shoulder ROM by 5-10 degrees in all planes  4) Increase shoulder strength by 1/2 MMT grade in all deficient planes  Long Term Goals: to be achieved by discharge  1) FOTO equal to or greater than 68   2) Patient to be independent with comprehensive HEP  3) Patient will demonstrate maximal over head reaching  4) Increase UE strength to 5/5 MMT grade in all planes to improve a/iadls  5) Patient to report no sleep interruption secondary to pain      Plan  Patient would benefit from: skilled physical therapy  Planned modality interventions: cryotherapy and thermotherapy: hydrocollator packs  Planned therapy interventions: neuromuscular re-education, patient education, stretching, strengthening, therapeutic activities, therapeutic exercise, therapeutic training, home exercise program and graded activity  Frequency: Twice a week for 10 weeks  Treatment plan discussed with: patient        Subjective Evaluation    History of Present Illness  Mechanism of injury: Pt has had chronic R shoulder pain for years  In the past month, he was working on his car and heard a pop in his elbow when he was lifting a tire  Pt was evaluated by Ortho and received MRI showing bursitis  Pt now presents to PT  Pt initially had swelling in elbow but his pain is now in the shoulder  Pt has PMH of DM2  Quality of life: good    Pain  Current pain ratin  At best pain ratin  At worst pain ratin  Quality: radiating, dull ache, throbbing and sharp  Aggravating factors: overhead activity, keyboarding and lifting    Hand dominance: right    Patient Goals  Patient goals for therapy: decreased pain, independence with ADLs/IADLs, return to sport/leisure activities, increased strength and increased motion          Objective     Tenderness     Right Shoulder  Tenderness in the biceps tendon (proximal), bicipital groove and supraspinatus tendon  Active Range of Motion     Right Shoulder   Flexion: 110 degrees with pain  Abduction: 90 degrees with pain    Scapular Mobility     Right Shoulder   Scapular mobility: fair    Strength/Myotome Testing     Right Shoulder     Planes of Motion   Flexion: 4-   Abduction: 4-   External rotation at 0°: 4-     Right Elbow   Flexion: 4  Extension: 4  Forearm supination: 4-    Tests     Right Shoulder   Positive empty can, full can, Neer's, painful arc and Speed's  Negative drop arm and Dwayne's sign                Precautions: DM2      Manuals                                                                 Neuro Re-Ed Ther Ex 9/13            Table Slides HEP            Thoracic ext HEP                                                                                          Ther Activity                                       Gait Training                                       Modalities

## 2022-09-19 ENCOUNTER — OFFICE VISIT (OUTPATIENT)
Dept: PHYSICAL THERAPY | Age: 52
End: 2022-09-19
Payer: COMMERCIAL

## 2022-09-19 DIAGNOSIS — G89.29 CHRONIC RIGHT SHOULDER PAIN: Primary | ICD-10-CM

## 2022-09-19 DIAGNOSIS — M25.511 CHRONIC RIGHT SHOULDER PAIN: Primary | ICD-10-CM

## 2022-09-19 DIAGNOSIS — M75.21 BICEPS TENDONITIS ON RIGHT: ICD-10-CM

## 2022-09-19 PROCEDURE — 97140 MANUAL THERAPY 1/> REGIONS: CPT

## 2022-09-19 PROCEDURE — 97110 THERAPEUTIC EXERCISES: CPT

## 2022-09-19 NOTE — PROGRESS NOTES
Daily Note     Today's date: 2022  Patient name: Jennifer Hoff  : 1970  MRN: [id number]  Referring provider: Anthony Murphy  Dx:   Encounter Diagnosis     ICD-10-CM    1  Chronic right shoulder pain  M25 511     G89 29    2  Biceps tendonitis on right  M75 21                   Subjective: pt reports R UE very sore today from doing yard work over the weekend      Objective: See treatment diary below      Assessment: ex progressions as per PT, pt able to jc gentle PROM R elbow with min discomfort at times, TTP cont R proximal bicep insertion       Plan: Continue per plan of care        Precautions: DM2      Manuals  22           Gentle PROM R elbow  15'                                                  Neuro Re-Ed                                                                                                        Ther Ex             Table Slides HEP 10x10"           Thoracic ext HEP 2x10x5"           Railing slides  Flex 10x5"                                                                            Ther Activity                                       Gait Training                                       Modalities

## 2022-09-28 ENCOUNTER — VBI (OUTPATIENT)
Dept: ADMINISTRATIVE | Facility: OTHER | Age: 52
End: 2022-09-28

## 2022-10-11 PROBLEM — Z12.11 SCREENING FOR COLON CANCER: Status: RESOLVED | Noted: 2022-04-07 | Resolved: 2022-10-11

## 2022-10-12 ENCOUNTER — TELEPHONE (OUTPATIENT)
Dept: NEPHROLOGY | Facility: CLINIC | Age: 52
End: 2022-10-12

## 2022-10-12 NOTE — TELEPHONE ENCOUNTER
Called and spoke with [employer] to complete their bloodwork prior to their appointment on 10/18 with Dr Johnny Hua at the [facility]

## 2022-10-13 ENCOUNTER — TELEPHONE (OUTPATIENT)
Dept: NEPHROLOGY | Facility: CLINIC | Age: 52
End: 2022-10-13

## 2022-10-13 NOTE — TELEPHONE ENCOUNTER
Called and spoke with [employer] to complete their bloodwork prior to their appointment on 10/18 with Dr Brielle Pagan at the [facility]

## 2022-10-17 ENCOUNTER — TELEPHONE (OUTPATIENT)
Dept: NEPHROLOGY | Facility: CLINIC | Age: 52
End: 2022-10-17

## 2022-10-17 NOTE — TELEPHONE ENCOUNTER
Appointment Confirmation   Person confirmed appointment with  If not patient, name of the person n/a    Date and time of appointment 10/18   Patient acknowledged and will be at appointment? no   Did you advise the patient that they will need a urine sample if they are a new patient? no   Did you advise the patient to bring their current medications for verification? (including any OTC) no   Additional Information Unable to leave voicemail

## 2022-10-20 ENCOUNTER — TELEPHONE (OUTPATIENT)
Dept: NEPHROLOGY | Facility: CLINIC | Age: 52
End: 2022-10-20

## 2022-10-20 NOTE — TELEPHONE ENCOUNTER
Tried to call patient to reschedule appointment that was missed, but I am unable to leave voicemail  Letter mailed

## 2022-11-01 ENCOUNTER — VBI (OUTPATIENT)
Dept: ADMINISTRATIVE | Facility: OTHER | Age: 52
End: 2022-11-01

## 2022-12-06 ENCOUNTER — VBI (OUTPATIENT)
Dept: ADMINISTRATIVE | Facility: OTHER | Age: 52
End: 2022-12-06

## 2022-12-11 ENCOUNTER — VBI (OUTPATIENT)
Dept: ADMINISTRATIVE | Facility: OTHER | Age: 52
End: 2022-12-11

## 2022-12-14 ENCOUNTER — TELEPHONE (OUTPATIENT)
Dept: OBGYN CLINIC | Facility: CLINIC | Age: 52
End: 2022-12-14

## 2022-12-14 NOTE — TELEPHONE ENCOUNTER
Left message for patient to call [phone] to reschedule missed appointment with Dr Andres Young on 9/9 for right bicep  12/14 LB

## 2022-12-28 ENCOUNTER — VBI (OUTPATIENT)
Dept: ADMINISTRATIVE | Facility: OTHER | Age: 52
End: 2022-12-28

## 2023-04-06 ENCOUNTER — RA CDI HCC (OUTPATIENT)
Dept: OTHER | Facility: HOSPITAL | Age: 53
End: 2023-04-06

## 2023-04-06 NOTE — PROGRESS NOTES
[facility]  coding opportunities       Chart Reviewed number of suggestions sent to Provider: 2     Patients Insurance     Commercial Insurance: [facility]       E11 65: Type 2 diabetes mellitus with hyperglycemia [facility]) [[id number]]    E11 42: Type 2 diabetes, controlled, with peripheral neuropathy [facility]) [[id number]]    Refer to 5/25/2022 when last assessed - please review and assess and document per MEAT if applicable for 1578

## 2023-05-07 DIAGNOSIS — S49.91XA INJURY OF RIGHT SHOULDER, INITIAL ENCOUNTER: ICD-10-CM

## 2023-08-05 RX ORDER — PREDNISONE 20 MG/1
TABLET ORAL
Qty: 10 TABLET | Refills: 0 | OUTPATIENT
Start: 2023-08-05

## 2023-12-30 NOTE — LETTER
August 17, 2022     Patient: Sophia Davis   YOB: 1970   Date of Visit: 8/17/2022       To Whom it May Concern:    Casiekana Bull was seen in my clinic on 8/17/2022  He may return on 8/20/2022  If you have any questions or concerns, please don't hesitate to call           Sincerely,          PEDRO Whitten        CC: No Recipients 3

## 2024-02-21 PROBLEM — J01.90 ACUTE BACTERIAL SINUSITIS: Status: RESOLVED | Noted: 2018-06-20 | Resolved: 2024-02-21

## 2024-02-21 PROBLEM — B96.89 ACUTE BACTERIAL SINUSITIS: Status: RESOLVED | Noted: 2018-06-20 | Resolved: 2024-02-21

## 2024-02-21 PROBLEM — H66.90 ACUTE OTITIS MEDIA: Status: RESOLVED | Noted: 2018-06-20 | Resolved: 2024-02-21

## 2024-06-06 NOTE — PROGRESS NOTES
Assessment/Plan:    Type 2 diabetes mellitus without complication, with long-term current use of insulin ([facility])    Lab Results   Component Value Date    HGBA1C 9 1 (A) 07/11/2019     -Patient has not done his A1c due to pandemic but states he will do it this time  I am ordering A1c and CMP to be done on him  Advised on diet modifications and exercise  Pt to return in 3 months  To continue on insulin and metformin and will adjust accordingly  Hypertension  -Pt currently on metoprolol and lisinopril  To continue on this regimen  His BP was controlled today in office  Advised to take his BP x 2 weeks and call us with BP log  If elevated will consider adding a 3rd BP medication  Pt in agreement  To continue low sodium diet as indicated  Diagnoses and all orders for this visit:    Type 2 diabetes mellitus without complication, with long-term current use of insulin ([facility])  -     insulin glargine (Lantus SoloStar) 100 units/mL injection pen; Inject 40 Units under the skin daily at bedtime  -     Insulin Pen Needle (B-D UF III MINI PEN NEEDLES) 31G X 5 MM MISC; Inject under the skin daily at bedtime  -     Hemoglobin A1C; Future  -     Comprehensive metabolic panel; Future  -     Accu-Chek FastClix Lancets MISC; by Does not apply route 2 (two) times a day    Hypertension, unspecified type  -     metoprolol tartrate (LOPRESSOR) 100 mg tablet; Take 1 tablet (100 mg total) by mouth daily  -     lisinopril (ZESTRIL) 40 mg tablet; Take 1 tablet (40 mg total) by mouth daily    Depression, unspecified depression type  -     sertraline (ZOLOFT) 100 mg tablet; Take 1 tablet (100 mg total) by mouth daily    Screening for cholesterol level  -     Lipid panel; Future    Multiple joint pain  -     ibuprofen (MOTRIN) 800 mg tablet; Take 1 tablet (800 mg total) by mouth 3 (three) times a day as needed for mild pain or moderate pain Take with food    Vitamin D deficiency  -     ergocalciferol (VITAMIN D2) 50,000 units;  Take 1 capsule (50,000 Units total) by mouth once a week    Mixed hyperlipidemia  -     atorvastatin (LIPITOR) 40 mg tablet; Take 1 tablet (40 mg total) by mouth daily    Other orders  -     Cancel: Comprehensive metabolic panel; Future  -     Cancel: Hemoglobin A1C; Future  -     Discontinue: Insulin Pen Needle (B-D UF III MINI PEN NEEDLES) 31G X 5 MM MISC; Inject under the skin daily at bedtime  -     Cancel: amLODIPine (NORVASC) 10 mg tablet; Take 1 tablet (10 mg total) by mouth daily  -     Discontinue: Accu-Chek FastClix Lancets MISC; by Does not apply route 2 (two) times a day      BMI Counseling: Body mass index is 33 79 kg/m²  The BMI is above normal  Nutrition recommendations include encouraging healthy choices of fruits and vegetables, decreasing fast food intake, consuming healthier snacks, limiting drinks that contain sugar, increasing intake of lean protein, reducing intake of saturated and trans fat and reducing intake of cholesterol  Exercise recommendations include moderate physical activity 150 minutes/week  Depression Screening and Follow-up Plan: Clincally patient does not have depression  No treatment is required  Tobacco Cessation Counseling: Tobacco cessation counseling was not provided  The patient is sincerely urged to quit consumption of tobacco  He is not ready to quit tobacco         Subjective:      Patient ID: Savanna Fitzpatrick is a 52 y o  male  52year old male patient here for follow up appointment  Diabetes   He presents for his follow-up diabetic visit  He has type 2 diabetes mellitus  No MedicAlert identification noted  His disease course has been stable  There are no hypoglycemic associated symptoms  Pertinent negatives for hypoglycemia include no dizziness, headaches, hunger, nervousness/anxiousness, seizures, speech difficulty, sweats or tremors  Associated symptoms include weight loss   Pertinent negatives for diabetes include no chest pain, no fatigue, no foot paresthesias, no polydipsia, no polyuria and no visual change  There are no hypoglycemic complications  Symptoms are stable  There are no diabetic complications  Pertinent negatives for diabetic complications include no autonomic neuropathy, CVA, heart disease, impotence or peripheral neuropathy  Risk factors for coronary artery disease include diabetes mellitus, male sex, obesity, hypertension and family history  Current diabetic treatment includes oral agent (monotherapy) and insulin injections  He is compliant with treatment all of the time  His weight is stable  He is following a generally unhealthy diet  When asked about meal planning, he reported none  He has not had a previous visit with a dietitian  He participates in exercise intermittently  An ACE inhibitor/angiotensin II receptor blocker is being taken  He does not see a podiatrist Eye exam is not current  Hypertension   This is a chronic problem  The current episode started more than 1 year ago  The problem has been waxing and waning since onset  The problem is controlled  Pertinent negatives include no chest pain, headaches, palpitations, peripheral edema, shortness of breath or sweats  There are no associated agents to hypertension  Risk factors for coronary artery disease include male gender and obesity  Past treatments include ACE inhibitors and beta blockers  The current treatment provides significant improvement  Compliance problems include exercise and diet  There is no history of angina, CAD/MI, CVA or left ventricular hypertrophy  There is no history of chronic renal disease, sleep apnea or a thyroid problem  The following portions of the patient's history were reviewed and updated as appropriate: allergies, current medications, past family history, past medical history, past social history, past surgical history and problem list     Review of Systems   Constitutional: Positive for weight loss   Negative for appetite change, fatigue and No fever    HENT: Negative  Negative for congestion  Eyes: Negative  Respiratory: Negative  Negative for cough, chest tightness, shortness of breath and wheezing  Cardiovascular: Negative  Negative for chest pain and palpitations  Gastrointestinal: Negative  Negative for abdominal distention  Endocrine: Negative  Negative for polydipsia and polyuria  Genitourinary: Negative  Negative for difficulty urinating and impotence  Musculoskeletal: Negative  Negative for arthralgias and gait problem  Skin: Negative  Allergic/Immunologic: Negative  Neurological: Negative  Negative for dizziness, tremors, seizures, speech difficulty and headaches  Hematological: Negative  Negative for adenopathy  Does not bruise/bleed easily  Psychiatric/Behavioral: Negative  The patient is not nervous/anxious  Objective:      /90 (BP Location: Left arm, Patient Position: Sitting, Cuff Size: Standard)   Pulse 65   Temp 97 7 °F (36 5 °C) (Temporal)   Resp 20   Ht 5' 9" (1 753 m)   Wt 104 kg (228 lb 12 8 oz)   SpO2 98%   BMI 33 79 kg/m²          Physical Exam   Constitutional: He is oriented to person, place, and time  He appears well-developed and well-nourished  No distress  HENT:   Head: Normocephalic and atraumatic  Right Ear: External ear normal    Left Ear: External ear normal    Eyes: Pupils are equal, round, and reactive to light  EOM are normal    Neck: Normal range of motion  Neck supple  Cardiovascular: Normal rate, regular rhythm and normal heart sounds  Pulmonary/Chest: Effort normal and breath sounds normal  No respiratory distress  He has no wheezes  Abdominal: Soft  Bowel sounds are normal    Musculoskeletal: Normal range of motion  Lymphadenopathy:     He has no cervical adenopathy  Neurological: He is alert and oriented to person, place, and time  Skin: Skin is warm and dry  Capillary refill takes less than 2 seconds  He is not diaphoretic     Psychiatric: He has a normal mood and affect  His behavior is normal    Nursing note and vitals reviewed

## 2024-12-03 ENCOUNTER — APPOINTMENT (OUTPATIENT)
Dept: RADIOLOGY | Age: 54
End: 2024-12-03
Payer: COMMERCIAL

## 2024-12-03 ENCOUNTER — OFFICE VISIT (OUTPATIENT)
Dept: URGENT CARE | Age: 54
End: 2024-12-03
Payer: COMMERCIAL

## 2024-12-03 ENCOUNTER — OCCMED (OUTPATIENT)
Dept: URGENT CARE | Age: 54
End: 2024-12-03
Payer: OTHER MISCELLANEOUS

## 2024-12-03 DIAGNOSIS — L03.012 PARONYCHIA OF LEFT MIDDLE FINGER: Primary | ICD-10-CM

## 2024-12-03 DIAGNOSIS — M25.531 RIGHT WRIST PAIN: ICD-10-CM

## 2024-12-03 DIAGNOSIS — M25.531 RIGHT WRIST PAIN: Primary | ICD-10-CM

## 2024-12-03 PROCEDURE — G0382 LEV 3 HOSP TYPE B ED VISIT: HCPCS

## 2024-12-03 PROCEDURE — 10060 I&D ABSCESS SIMPLE/SINGLE: CPT

## 2024-12-03 PROCEDURE — 99283 EMERGENCY DEPT VISIT LOW MDM: CPT

## 2024-12-03 PROCEDURE — 73110 X-RAY EXAM OF WRIST: CPT

## 2024-12-03 RX ORDER — CEPHALEXIN 500 MG/1
500 CAPSULE ORAL EVERY 8 HOURS SCHEDULED
Qty: 15 CAPSULE | Refills: 0 | Status: SHIPPED | OUTPATIENT
Start: 2024-12-03 | End: 2024-12-08

## 2024-12-03 NOTE — PATIENT INSTRUCTIONS
Please begin antibiotics as directed.   Please continue Epsom salt soaks 3-5 times daily.   Follow up with PCP if no relief within one week.

## 2024-12-03 NOTE — PROGRESS NOTES
Assessment/Plan  Please begin antibiotics as directed.   Please continue Epsom salt soaks 3-5 times daily.   Follow up with PCP if no relief within one week.     Paronychia of left middle finger [L03.012]  1. Paronychia of left middle finger  cephalexin (KEFLEX) 500 mg capsule      Patient Education     Paronychia ED   General Information   You came to the Emergency Department (ED) for a skin infection around your nail. The medical name for this is paronychia. The skin around your nail may be sore, red, or swollen. You may have small blisters near your nail. Sometimes, the doctor will drain the area of infection. The doctors may give you antibiotics to treat the infection. If so, it is important to take all of your antibiotics, even if you start to feel better.  What care is needed at home?   Call your regular doctor to let them know you were in the ED. Make a follow-up appointment if you were told to.  Soak your nail in warm water for 20 minutes, 3 times each day.  Put an antibiotic cream or ointment on the infected area after soaking. Then, place a clean, dry dressing over the area.  Try not to bite your nails or cut your cuticles. Doing these things can increase your risk of getting another infection in the nail area.  When do I need to call the doctor?   You have a fever of 100.4°F (38°C) or higher or chills.  You have worsening of the infection around your nail, like swelling, redness, warmth, pain, or fluid draining from the wound.  You have new or worsening symptoms.  Last Reviewed Date   2021-06-15  Consumer Information Use and Disclaimer   This generalized information is a limited summary of diagnosis, treatment, and/or medication information. It is not meant to be comprehensive and should be used as a tool to help the user understand and/or assess potential diagnostic and treatment options. It does NOT include all information about conditions, treatments, medications, side effects, or risks that may  apply to a specific patient. It is not intended to be medical advice or a substitute for the medical advice, diagnosis, or treatment of a health care provider based on the health care provider's examination and assessment of a patient’s specific and unique circumstances. Patients must speak with a health care provider for complete information about their health, medical questions, and treatment options, including any risks or benefits regarding use of medications. This information does not endorse any treatments or medications as safe, effective, or approved for treating a specific patient. [employer] and its affiliates disclaim any warranty or liability relating to this information or the use thereof. The use of this information is governed by the Terms of Use, available at https://www.[employer].com/en/know/clinical-effectiveness-terms   Copyright   Copyright © 2024 [employer] and its affiliates and/or licensors. All rights reserved.         Subjective:     Patient ID: Jerson Casas is a 54 y.o. male.      Reason For Visit / Chief Complaint  No chief complaint on file.        Patient is a 54 year old male with PMH significant for HTN, diabetes mellitus, CKD, who presents for evaluation of paronychia of left middle finger which has been worsening over the past week. He reports that several days ago he squeezed it and got a significant amount of purulent drainage out, but notes continued swelling and pain since then. He denies numbness, tingling, fever, direct trauma/injury.             Past Medical History:   Diagnosis Date    Depression     last assessed - 22Oct2012    Diabetes mellitus (HCC)     Hepatomegaly     Hypertension     Kidney stone     Obesity     Splenomegaly     Syncope     last assessed - [id number]    Vertigo     last assessed - 05Aug2013       Past Surgical History:   Procedure Laterality Date    NEUROPLASTY / TRANSPOSITION MEDIAN NERVE AT CARPAL TUNNEL      TONSILLECTOMY         Family  History   Problem Relation Age of Onset    Hypertension Mother         Benign Essential HTN    Heart disease Mother     Sleep apnea Mother     Diabetes Father     Heart disease Father     Hypertension Father         Benign Essential HTN       Review of Systems   Constitutional:  Negative for fatigue and fever.   HENT:  Negative for congestion, ear discharge, ear pain, postnasal drip, rhinorrhea, sinus pressure, sinus pain, sneezing and sore throat.    Eyes: Negative.  Negative for pain, discharge, redness and itching.   Respiratory: Negative.  Negative for apnea, cough, choking, chest tightness, shortness of breath, wheezing and stridor.    Cardiovascular: Negative.  Negative for chest pain and palpitations.   Gastrointestinal: Negative.  Negative for diarrhea, nausea and vomiting.   Endocrine: Negative.  Negative for polydipsia, polyphagia and polyuria.   Genitourinary: Negative.  Negative for decreased urine volume and flank pain.   Musculoskeletal: Negative.  Negative for arthralgias, back pain, myalgias, neck pain and neck stiffness.   Skin:  Positive for color change and wound. Negative for pallor and rash.   Allergic/Immunologic: Negative.  Negative for environmental allergies.   Neurological: Negative.  Negative for dizziness, facial asymmetry, light-headedness, numbness and headaches.   Hematological: Negative.  Negative for adenopathy.   Psychiatric/Behavioral: Negative.         Objective:    There were no vitals taken for this visit.    Physical Exam  Vitals reviewed.   Constitutional:       General: He is not in acute distress.     Appearance: Normal appearance. He is not ill-appearing, toxic-appearing or diaphoretic.      Interventions: He is not intubated.  HENT:      Head: Normocephalic and atraumatic.      Right Ear: Tympanic membrane, ear canal and external ear normal. There is no impacted cerumen.      Left Ear: Tympanic membrane, ear canal and external ear normal. There is no impacted cerumen.       Nose: Nose normal. No congestion or rhinorrhea.      Mouth/Throat:      Mouth: Mucous membranes are moist.      Pharynx: Oropharynx is clear. Uvula midline. No pharyngeal swelling, oropharyngeal exudate, posterior oropharyngeal erythema or uvula swelling.      Tonsils: No tonsillar exudate or tonsillar abscesses. 1+ on the right. 1+ on the left.   Eyes:      Extraocular Movements: Extraocular movements intact.      Conjunctiva/sclera: Conjunctivae normal.      Pupils: Pupils are equal, round, and reactive to light.   Cardiovascular:      Rate and Rhythm: Normal rate and regular rhythm.      Pulses: Normal pulses.      Heart sounds: Normal heart sounds, S1 normal and S2 normal. Heart sounds not distant. No murmur heard.     No friction rub. No gallop.   Pulmonary:      Effort: Pulmonary effort is normal. No tachypnea, bradypnea, accessory muscle usage, prolonged expiration, respiratory distress or retractions. He is not intubated.      Breath sounds: Normal breath sounds. No stridor, decreased air movement or transmitted upper airway sounds. No decreased breath sounds, wheezing, rhonchi or rales.   Chest:      Chest wall: No tenderness.   Musculoskeletal:         General: Normal range of motion.      Cervical back: Normal range of motion and neck supple. No rigidity or tenderness.   Lymphadenopathy:      Cervical: No cervical adenopathy.   Skin:     General: Skin is warm and dry.      Capillary Refill: Capillary refill takes less than 2 seconds.      Findings: No erythema.             Comments: There is a paronychia of the left middle finger.    Neurological:      General: No focal deficit present.      Mental Status: He is alert.   Psychiatric:         Mood and Affect: Mood normal.     Incision and drain    Date/Time: 12/3/2024 10:00 AM    Performed by: PEDRO Zhao  Authorized by: PEDRO Zhao  Universal Protocol:  procedure performed by consultantConsent: Verbal consent obtained.  Risks and benefits:  risks, benefits and alternatives were discussed  Consent given by: patient  Patient understanding: patient states understanding of the procedure being performed  Patient identity confirmed: verbally with patient and provided demographic data    Patient location:  Clinic  Location:     Indications for incision and drainage: Paronychia.    Location:  Upper extremity    Upper extremity location:  L long finger  Pre-procedure details:     Skin preparation:  Antiseptic wash  Anesthesia (see MAR for exact dosages):     Anesthesia method:  None  Procedure details:     Complexity:  Simple    Needle aspiration: no      Incision types:  Stab incision    Scalpel blade:  11    Approach:  Open    Incision depth:  Superficial    Drainage:  Purulent and bloody    Drainage amount:  Moderate    Wound treatment:  Wound left open    Packing materials:  None  Post-procedure details:     Patient tolerance of procedure:  Tolerated well, no immediate complications

## 2024-12-09 ENCOUNTER — APPOINTMENT (OUTPATIENT)
Age: 54
End: 2024-12-09
Payer: OTHER MISCELLANEOUS

## 2024-12-09 PROCEDURE — 99214 OFFICE O/P EST MOD 30 MIN: CPT | Performed by: EMERGENCY MEDICINE
